# Patient Record
Sex: FEMALE | Race: WHITE | NOT HISPANIC OR LATINO | Employment: FULL TIME | URBAN - METROPOLITAN AREA
[De-identification: names, ages, dates, MRNs, and addresses within clinical notes are randomized per-mention and may not be internally consistent; named-entity substitution may affect disease eponyms.]

---

## 2023-03-21 DIAGNOSIS — E11.9 TYPE 2 DIABETES MELLITUS WITHOUT COMPLICATION, WITHOUT LONG-TERM CURRENT USE OF INSULIN (MULTI): ICD-10-CM

## 2023-03-21 RX ORDER — METFORMIN HYDROCHLORIDE 500 MG/1
500 TABLET ORAL
Qty: 60 TABLET | Refills: 0 | Status: SHIPPED | OUTPATIENT
Start: 2023-03-21 | End: 2023-04-13 | Stop reason: SDUPTHER

## 2023-03-21 RX ORDER — METFORMIN HYDROCHLORIDE 500 MG/1
500 TABLET ORAL
COMMUNITY
Start: 2019-03-19 | End: 2023-03-21 | Stop reason: SDUPTHER

## 2023-03-25 PROBLEM — E55.9 VITAMIN D DEFICIENCY: Status: ACTIVE | Noted: 2023-03-25

## 2023-03-25 PROBLEM — D72.829 ELEVATED WBCS: Status: ACTIVE | Noted: 2023-03-25

## 2023-03-25 PROBLEM — J98.8 VIRAL RESPIRATORY ILLNESS: Status: ACTIVE | Noted: 2023-03-25

## 2023-03-25 PROBLEM — T78.40XA ALLERGY: Status: ACTIVE | Noted: 2023-03-25

## 2023-03-25 PROBLEM — E66.811 OBESITY (BMI 30.0-34.9): Status: ACTIVE | Noted: 2023-03-25

## 2023-03-25 PROBLEM — E78.5 HYPERLIPIDEMIA: Status: ACTIVE | Noted: 2023-03-25

## 2023-03-25 PROBLEM — D64.9 ANEMIA: Status: ACTIVE | Noted: 2023-03-25

## 2023-03-25 PROBLEM — I10 ACCELERATED HYPERTENSION: Status: ACTIVE | Noted: 2023-03-25

## 2023-03-25 PROBLEM — E66.9 OBESITY (BMI 30.0-34.9): Status: ACTIVE | Noted: 2023-03-25

## 2023-03-25 PROBLEM — R01.1 HEART MURMUR: Status: ACTIVE | Noted: 2023-03-25

## 2023-03-25 PROBLEM — I10 HTN (HYPERTENSION): Status: ACTIVE | Noted: 2023-03-25

## 2023-03-25 PROBLEM — R00.0 SINUS TACHYCARDIA: Status: ACTIVE | Noted: 2023-03-25

## 2023-03-25 PROBLEM — R50.9 FEVER: Status: ACTIVE | Noted: 2023-03-25

## 2023-03-25 PROBLEM — I10 UNCONTROLLED HYPERTENSION: Status: ACTIVE | Noted: 2023-03-25

## 2023-03-25 PROBLEM — B97.89 VIRAL RESPIRATORY ILLNESS: Status: ACTIVE | Noted: 2023-03-25

## 2023-03-25 PROBLEM — R68.89 FLU-LIKE SYMPTOMS: Status: ACTIVE | Noted: 2023-03-25

## 2023-03-25 PROBLEM — R10.13 DYSPEPSIA: Status: ACTIVE | Noted: 2023-03-25

## 2023-03-25 PROBLEM — E11.9 T2DM (TYPE 2 DIABETES MELLITUS) (MULTI): Status: ACTIVE | Noted: 2023-03-25

## 2023-03-25 RX ORDER — ROSUVASTATIN CALCIUM 20 MG/1
1 TABLET, COATED ORAL DAILY
COMMUNITY
Start: 2019-03-21 | End: 2023-04-13 | Stop reason: SDUPTHER

## 2023-03-25 RX ORDER — LISINOPRIL 20 MG/1
1 TABLET ORAL DAILY
COMMUNITY
Start: 2023-03-03 | End: 2023-04-13 | Stop reason: SDUPTHER

## 2023-03-25 RX ORDER — METOPROLOL SUCCINATE 50 MG/1
1 TABLET, EXTENDED RELEASE ORAL DAILY
COMMUNITY
Start: 2019-05-10 | End: 2023-04-13

## 2023-03-25 RX ORDER — FLUTICASONE PROPIONATE 50 MCG
1 SPRAY, SUSPENSION (ML) NASAL DAILY
COMMUNITY
Start: 2019-03-11

## 2023-03-25 RX ORDER — ASPIRIN 81 MG/1
1 TABLET ORAL DAILY
COMMUNITY
Start: 2019-03-19

## 2023-03-25 RX ORDER — CHOLECALCIFEROL (VITAMIN D3) 50 MCG
TABLET ORAL
COMMUNITY
Start: 2019-03-19

## 2023-03-25 RX ORDER — IBUPROFEN 800 MG/1
TABLET ORAL
COMMUNITY
Start: 2021-12-07 | End: 2023-11-14 | Stop reason: WASHOUT

## 2023-03-28 ENCOUNTER — TELEPHONE (OUTPATIENT)
Dept: PRIMARY CARE | Facility: CLINIC | Age: 58
End: 2023-03-28
Payer: COMMERCIAL

## 2023-03-30 ENCOUNTER — APPOINTMENT (OUTPATIENT)
Dept: PRIMARY CARE | Facility: CLINIC | Age: 58
End: 2023-03-30
Payer: COMMERCIAL

## 2023-04-13 ENCOUNTER — OFFICE VISIT (OUTPATIENT)
Dept: PRIMARY CARE | Facility: CLINIC | Age: 58
End: 2023-04-13
Payer: COMMERCIAL

## 2023-04-13 VITALS — HEART RATE: 104 BPM | OXYGEN SATURATION: 97 % | HEIGHT: 62 IN | WEIGHT: 203 LBS | BODY MASS INDEX: 37.36 KG/M2

## 2023-04-13 DIAGNOSIS — I10 HYPERTENSION, UNSPECIFIED TYPE: Primary | ICD-10-CM

## 2023-04-13 DIAGNOSIS — E78.5 HYPERLIPIDEMIA, UNSPECIFIED HYPERLIPIDEMIA TYPE: ICD-10-CM

## 2023-04-13 DIAGNOSIS — E11.9 TYPE 2 DIABETES MELLITUS WITHOUT COMPLICATION, WITHOUT LONG-TERM CURRENT USE OF INSULIN (MULTI): ICD-10-CM

## 2023-04-13 DIAGNOSIS — I10 ACCELERATED HYPERTENSION: ICD-10-CM

## 2023-04-13 PROCEDURE — 80061 LIPID PANEL: CPT

## 2023-04-13 PROCEDURE — 99214 OFFICE O/P EST MOD 30 MIN: CPT | Performed by: INTERNAL MEDICINE

## 2023-04-13 PROCEDURE — 84443 ASSAY THYROID STIM HORMONE: CPT

## 2023-04-13 PROCEDURE — 85027 COMPLETE CBC AUTOMATED: CPT

## 2023-04-13 PROCEDURE — 4010F ACE/ARB THERAPY RXD/TAKEN: CPT | Performed by: INTERNAL MEDICINE

## 2023-04-13 PROCEDURE — 83036 HEMOGLOBIN GLYCOSYLATED A1C: CPT

## 2023-04-13 PROCEDURE — 80048 BASIC METABOLIC PNL TOTAL CA: CPT

## 2023-04-13 PROCEDURE — 1036F TOBACCO NON-USER: CPT | Performed by: INTERNAL MEDICINE

## 2023-04-13 PROCEDURE — 82570 ASSAY OF URINE CREATININE: CPT

## 2023-04-13 PROCEDURE — 82043 UR ALBUMIN QUANTITATIVE: CPT

## 2023-04-13 PROCEDURE — 36415 COLL VENOUS BLD VENIPUNCTURE: CPT | Performed by: INTERNAL MEDICINE

## 2023-04-13 RX ORDER — METFORMIN HYDROCHLORIDE 500 MG/1
500 TABLET ORAL
Qty: 180 TABLET | Refills: 1 | Status: SHIPPED | OUTPATIENT
Start: 2023-04-13 | End: 2023-04-21 | Stop reason: SDUPTHER

## 2023-04-13 RX ORDER — ROSUVASTATIN CALCIUM 20 MG/1
20 TABLET, COATED ORAL DAILY
Qty: 90 TABLET | Refills: 1 | Status: SHIPPED | OUTPATIENT
Start: 2023-04-13 | End: 2023-10-23

## 2023-04-13 RX ORDER — LISINOPRIL 20 MG/1
20 TABLET ORAL DAILY
Qty: 90 TABLET | Refills: 1 | Status: SHIPPED | OUTPATIENT
Start: 2023-04-13 | End: 2023-12-15 | Stop reason: SDUPTHER

## 2023-04-13 RX ORDER — AMLODIPINE BESYLATE 5 MG/1
5 TABLET ORAL DAILY
Qty: 30 TABLET | Refills: 2 | Status: SHIPPED | OUTPATIENT
Start: 2023-04-13 | End: 2023-08-08

## 2023-04-13 NOTE — PROGRESS NOTES
"Subjective   Patient ID: Karina Zapata is a 58 y.o. female who presents for Follow-up (Patient here for follow-up visit on blood pressure and refills).    HPI   Follow up visit, last seen in 2020, last lab testing in 2019.  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    Review of Systems  Weight gain    Objective   Pulse 104   Ht 1.575 m (5' 2\")   Wt 92.1 kg (203 lb)   SpO2 97%   BMI 37.13 kg/m²     Physical Exam  NAD. Cooperative.  HEENT: WNL  Neck: WNL  Lungs CTA  Heart: RRR    Assessment/Plan   Diagnoses and all orders for this visit:  Hypertension, unspecified type  -     lisinopril 20 mg tablet; Take 1 tablet (20 mg) by mouth once daily.  -     amLODIPine (Norvasc) 5 mg tablet; Take 1 tablet (5 mg) by mouth once daily.  -     CBC; Future  -     Albumin, urine, random; Future  Type 2 diabetes mellitus without complication, without long-term current use of insulin (CMS/[facility])  -     metFORMIN (Glucophage) 500 mg tablet; Take 1 tablet (500 mg) by mouth in the morning and 1 tablet (500 mg) in the evening. Take with meals.  -     Basic Metabolic Panel; Future  -     Hemoglobin A1C; Future  Hyperlipidemia, unspecified hyperlipidemia type  -     rosuvastatin (Crestor) 20 mg tablet; Take 1 tablet (20 mg) by mouth once daily.  -     TSH; Future  -     Lipid panel; Future  Accelerated hypertension  Close BP monitoring weekly at work, the patient is a hospice RN, office follow up in 12 weeks.     "

## 2023-04-14 LAB
ALBUMIN (MG/L) IN URINE: 28.3 MG/L
ALBUMIN/CREATININE (UG/MG) IN URINE: 51.1 UG/MG CRT (ref 0–30)
ANION GAP IN SER/PLAS: 19 MMOL/L (ref 10–20)
CALCIUM (MG/DL) IN SER/PLAS: 9.7 MG/DL (ref 8.6–10.6)
CARBON DIOXIDE, TOTAL (MMOL/L) IN SER/PLAS: 24 MMOL/L (ref 21–32)
CHLORIDE (MMOL/L) IN SER/PLAS: 99 MMOL/L (ref 98–107)
CHOLESTEROL (MG/DL) IN SER/PLAS: 249 MG/DL (ref 0–199)
CHOLESTEROL IN HDL (MG/DL) IN SER/PLAS: 72.9 MG/DL
CHOLESTEROL/HDL RATIO: 3.4
CREATININE (MG/DL) IN SER/PLAS: 0.67 MG/DL (ref 0.5–1.05)
CREATININE (MG/DL) IN URINE: 55.4 MG/DL (ref 20–320)
ERYTHROCYTE DISTRIBUTION WIDTH (RATIO) BY AUTOMATED COUNT: 12.7 % (ref 11.5–14.5)
ERYTHROCYTE MEAN CORPUSCULAR HEMOGLOBIN CONCENTRATION (G/DL) BY AUTOMATED: 30.9 G/DL (ref 32–36)
ERYTHROCYTE MEAN CORPUSCULAR VOLUME (FL) BY AUTOMATED COUNT: 87 FL (ref 80–100)
ERYTHROCYTES (10*6/UL) IN BLOOD BY AUTOMATED COUNT: 5.16 X10E12/L (ref 4–5.2)
ESTIMATED AVERAGE GLUCOSE FOR HBA1C: 295 MG/DL
GFR FEMALE: >90 ML/MIN/1.73M2
GLUCOSE (MG/DL) IN SER/PLAS: 291 MG/DL (ref 74–99)
HEMATOCRIT (%) IN BLOOD BY AUTOMATED COUNT: 45 % (ref 36–46)
HEMOGLOBIN (G/DL) IN BLOOD: 13.9 G/DL (ref 12–16)
HEMOGLOBIN A1C/HEMOGLOBIN TOTAL IN BLOOD: 11.9 %
LDL: 139 MG/DL (ref 0–99)
LEUKOCYTES (10*3/UL) IN BLOOD BY AUTOMATED COUNT: 11 X10E9/L (ref 4.4–11.3)
NRBC (PER 100 WBCS) BY AUTOMATED COUNT: 0 /100 WBC (ref 0–0)
PLATELETS (10*3/UL) IN BLOOD AUTOMATED COUNT: 338 X10E9/L (ref 150–450)
POTASSIUM (MMOL/L) IN SER/PLAS: 4.5 MMOL/L (ref 3.5–5.3)
SODIUM (MMOL/L) IN SER/PLAS: 137 MMOL/L (ref 136–145)
THYROTROPIN (MIU/L) IN SER/PLAS BY DETECTION LIMIT <= 0.05 MIU/L: 1.69 MIU/L (ref 0.44–3.98)
TRIGLYCERIDE (MG/DL) IN SER/PLAS: 184 MG/DL (ref 0–149)
UREA NITROGEN (MG/DL) IN SER/PLAS: 17 MG/DL (ref 6–23)
VLDL: 37 MG/DL (ref 0–40)

## 2023-04-20 ENCOUNTER — TELEMEDICINE (OUTPATIENT)
Dept: PRIMARY CARE | Facility: CLINIC | Age: 58
End: 2023-04-20
Payer: COMMERCIAL

## 2023-04-21 ENCOUNTER — TELEMEDICINE (OUTPATIENT)
Dept: PRIMARY CARE | Facility: CLINIC | Age: 58
End: 2023-04-21
Payer: COMMERCIAL

## 2023-04-21 DIAGNOSIS — E78.5 HYPERLIPIDEMIA, UNSPECIFIED HYPERLIPIDEMIA TYPE: ICD-10-CM

## 2023-04-21 DIAGNOSIS — E66.9 CLASS 2 OBESITY WITHOUT SERIOUS COMORBIDITY WITH BODY MASS INDEX (BMI) OF 37.0 TO 37.9 IN ADULT, UNSPECIFIED OBESITY TYPE: ICD-10-CM

## 2023-04-21 DIAGNOSIS — E11.9 TYPE 2 DIABETES MELLITUS WITHOUT COMPLICATION, WITHOUT LONG-TERM CURRENT USE OF INSULIN (MULTI): Primary | ICD-10-CM

## 2023-04-21 PROCEDURE — 99213 OFFICE O/P EST LOW 20 MIN: CPT | Performed by: INTERNAL MEDICINE

## 2023-04-21 RX ORDER — DULAGLUTIDE 0.75 MG/.5ML
0.75 INJECTION, SOLUTION SUBCUTANEOUS
Qty: 4 EACH | Refills: 11 | Status: SHIPPED | OUTPATIENT
Start: 2023-04-21 | End: 2023-08-09 | Stop reason: ALTCHOICE

## 2023-04-21 RX ORDER — METFORMIN HYDROCHLORIDE 500 MG/1
1000 TABLET ORAL
Qty: 180 TABLET | Refills: 1 | Status: SHIPPED | OUTPATIENT
Start: 2023-04-21 | End: 2024-04-16

## 2023-04-21 NOTE — PROGRESS NOTES
Subjective   Patient ID: Karina Zapata is a 58 y.o. female who presents for Follow-up (Video visit for lab results and treatment plans).  Lab results from 4/13/23 discussion and treatment plan. DM, lipids and weight goals were discussed.    Assessment/Plan   Diagnoses and all orders for this visit:  Type 2 diabetes mellitus without complication, without long-term current use of insulin (CMS/[facility])  -     dulaglutide (Trulicity) 0.75 mg/0.5 mL pen injector; Inject 0.75 mg under the skin 1 (one) time per week.  -     metFORMIN (Glucophage) 500 mg tablet; Take 2 tablets (1,000 mg) by mouth in the morning and 2 tablets (1,000 mg) in the evening. Take with meals.  Hyperlipidemia, unspecified hyperlipidemia type  Class 2 obesity without serious comorbidity with body mass index (BMI) of 37.0 to 37.9 in adult, unspecified obesity type  Rosuvastatin 20 mg at bed time,   3 months follow up labs   Discussed weight goals, recommended gradual weight loss by daily caloric reduction and low to moderate intensity exercise as tolerated, optimum BMI is 27

## 2023-08-06 DIAGNOSIS — I10 HYPERTENSION, UNSPECIFIED TYPE: ICD-10-CM

## 2023-08-08 DIAGNOSIS — E11.65 TYPE 2 DIABETES MELLITUS WITH HYPERGLYCEMIA, WITHOUT LONG-TERM CURRENT USE OF INSULIN (MULTI): Primary | ICD-10-CM

## 2023-08-08 RX ORDER — AMLODIPINE BESYLATE 5 MG/1
5 TABLET ORAL DAILY
Qty: 30 TABLET | Refills: 0 | Status: SHIPPED | OUTPATIENT
Start: 2023-08-08 | End: 2023-10-11

## 2023-08-09 ENCOUNTER — TELEMEDICINE (OUTPATIENT)
Dept: PHARMACY | Facility: HOSPITAL | Age: 58
End: 2023-08-09
Payer: COMMERCIAL

## 2023-08-09 DIAGNOSIS — E11.65 TYPE 2 DIABETES MELLITUS WITH HYPERGLYCEMIA, WITHOUT LONG-TERM CURRENT USE OF INSULIN (MULTI): Primary | ICD-10-CM

## 2023-08-09 RX ORDER — TIRZEPATIDE 2.5 MG/.5ML
2.5 INJECTION, SOLUTION SUBCUTANEOUS
Qty: 2 ML | Refills: 1 | Status: SHIPPED | OUTPATIENT
Start: 2023-08-09 | End: 2023-09-12 | Stop reason: ALTCHOICE

## 2023-08-09 ASSESSMENT — ENCOUNTER SYMPTOMS: DIABETIC ASSOCIATED SYMPTOMS: 0

## 2023-08-09 NOTE — ASSESSMENT & PLAN NOTE
Patient's diabetes is poorly controlled with most recent A1c of 11.9% on 4/13/23 (Goal < 7%).   Initiate: Mounjaro 2.5 mg once weekly  Continue: metformin 500 mg 2 tablets twice daily  Discontinue: Trulicity 0.75 mg (patient never received Rx)  Compliance at present is estimated to be very good.   Mounjaro education:   Counseled patient on MOA, expectations, side effects, duration of therapy, contraindications, administration, and monitoring parameters  Answered all patient questions and concerns  Counseled patient on Mounjaro MOA, expectations, side effects, duration of therapy, administration, and monitoring parameters.  Provided detailed dosing and administration counseling to ensure proper technique.   Reviewed Mounjaro titration schedule, starting with 2.5 mg once weekly to a goal of 15 mg once weekly if tolerated  Counseled patient on the benefits of GLP-1ra glycemic control and weight loss  Reviewed storage requirements of Mounjaro when not in use, and when to administer the medication if a dose is missed.  Advised patient that they may experience improved satiety after meals and portion sizes of meals may be reduced as doses of Mounjaro increase.    Follow-up: 8/29/23  PCP Follow-Up: patient to schedule for September

## 2023-08-09 NOTE — PROGRESS NOTES
Subjective   Patient ID: Karina Zapata is a 58 y.o. female who presents for Diabetes.    Referring Provider: Jacqui Johnston MD     Diabetes  She presents for her initial diabetic visit. She has type 2 diabetes mellitus. There are no diabetic associated symptoms. There are no hypoglycemic complications. Current diabetic treatment includes diet and oral agent (monotherapy). She is compliant with treatment all of the time. She is following a generally healthy diet. She participates in exercise three times a week. An ACE inhibitor/angiotensin II receptor blocker is being taken.     Patient stopped taking care of herself for 1-2 years due to deaths in the family. She has recently worked on her health.  Diet: More salad, less bread, leaner proteins, 1-2 cups of coffee, occasionally 1-2 glasses of wine  Exercise: 3-5x/week - [place], free weights/weight machines, 10,000-70996 steps/day, swimming since summer    Objective     There were no vitals taken for this visit.     Labs  Lab Results   Component Value Date    CALCIUM 9.7 04/13/2023    CO2 24 04/13/2023    CL 99 04/13/2023    CREATININE 0.67 04/13/2023    GLUCOSE 291 (H) 04/13/2023    K 4.5 04/13/2023     04/13/2023    BUN 17 04/13/2023    ANIONGAP 19 04/13/2023    GFRF >90 04/13/2023     Lab Results   Component Value Date    TRIG 184 (H) 04/13/2023    CHOL 249 (H) 04/13/2023    HDL 72.9 04/13/2023     Lab Results   Component Value Date    HGBA1C 11.9 (A) 04/13/2023       Current Outpatient Medications on File Prior to Visit   Medication Sig Dispense Refill    amLODIPine (Norvasc) 5 mg tablet Take 1 tablet (5 mg) by mouth once daily. ----DUE FOR APPT 30 tablet 0    aspirin 81 mg EC tablet Take 1 tablet (81 mg) by mouth once daily.      cholecalciferol (Vitamin D-3) 50 MCG (2000 UT) tablet Vitamin D3 2000 IU take one daily      fluticasone (Flonase) 50 mcg/actuation nasal spray Administer 1 spray into affected nostril(s) once daily.      ibuprofen  800 mg tablet TAKE 1 TABLET BY MOUTH EVERY 8 HOURS WITH FOOD AS NEEDED      lisinopril 20 mg tablet Take 1 tablet (20 mg) by mouth once daily. 90 tablet 1    metFORMIN (Glucophage) 500 mg tablet Take 2 tablets (1,000 mg) by mouth in the morning and 2 tablets (1,000 mg) in the evening. Take with meals. 180 tablet 1    rosuvastatin (Crestor) 20 mg tablet Take 1 tablet (20 mg) by mouth once daily. 90 tablet 1    [DISCONTINUED] amLODIPine (Norvasc) 5 mg tablet Take 1 tablet (5 mg) by mouth once daily. 30 tablet 2    [DISCONTINUED] dulaglutide (Trulicity) 0.75 mg/0.5 mL pen injector Inject 0.75 mg under the skin 1 (one) time per week. 4 each 11     No current facility-administered medications on file prior to visit.        Assessment/Plan   Problem List Items Addressed This Visit       T2DM (type 2 diabetes mellitus) (CMS/[facility]) - Primary     Patient's diabetes is poorly controlled with most recent A1c of 11.9% on 4/13/23 (Goal < 7%).   Initiate: Mounjaro 2.5 mg once weekly  Continue: metformin 500 mg 2 tablets twice daily  Discontinue: Trulicity 0.75 mg (patient never received Rx)  Compliance at present is estimated to be very good.   Mounjaro education:   Counseled patient on MOA, expectations, side effects, duration of therapy, contraindications, administration, and monitoring parameters  Answered all patient questions and concerns  Counseled patient on Mounjaro MOA, expectations, side effects, duration of therapy, administration, and monitoring parameters.  Provided detailed dosing and administration counseling to ensure proper technique.   Reviewed Mounjaro titration schedule, starting with 2.5 mg once weekly to a goal of 15 mg once weekly if tolerated  Counseled patient on the benefits of GLP-1ra glycemic control and weight loss  Reviewed storage requirements of Mounjaro when not in use, and when to administer the medication if a dose is missed.  Advised patient that they may experience improved satiety after meals  and portion sizes of meals may be reduced as doses of Mounjaro increase.    Follow-up: 8/29/23  PCP Follow-Up: patient to schedule for September         Relevant Medications    tirzepatide (Mounjaro) 2.5 mg/0.5 mL pen injector    Other Relevant Orders    Follow Up In [facility] - Pharmacy       Meg LazoD    Continue all meds under the continuation of care with the referring provider and clinical pharmacy team.    Verbal consent to manage patient's drug therapy was obtained from the patient. They were informed they may decline to participate or withdraw from participation in pharmacy services at any time.

## 2023-08-09 NOTE — PATIENT INSTRUCTIONS
Thank you for speaking with me today, Shannen. Since you did not start Trulicity in April we will start Mounjaro 2.5 mg once weekly injection. The prescription has been sent to [facility] for you to  at your convenience. Sounds like you are taking control of your health. Keep it up! We will have another appointment on 8/29 at 2:30. Don't hesitate to call me at [phone] before then if any issues arise before then.

## 2023-08-29 ENCOUNTER — APPOINTMENT (OUTPATIENT)
Dept: PHARMACY | Facility: HOSPITAL | Age: 58
End: 2023-08-29
Payer: COMMERCIAL

## 2023-09-12 ENCOUNTER — TELEMEDICINE (OUTPATIENT)
Dept: PHARMACY | Facility: HOSPITAL | Age: 58
End: 2023-09-12
Payer: COMMERCIAL

## 2023-09-12 DIAGNOSIS — E11.65 TYPE 2 DIABETES MELLITUS WITH HYPERGLYCEMIA, WITHOUT LONG-TERM CURRENT USE OF INSULIN (MULTI): Primary | ICD-10-CM

## 2023-09-12 RX ORDER — TIRZEPATIDE 5 MG/.5ML
5 INJECTION, SOLUTION SUBCUTANEOUS
Qty: 2 ML | Refills: 1 | Status: SHIPPED | OUTPATIENT
Start: 2023-09-12 | End: 2023-10-10 | Stop reason: ALTCHOICE

## 2023-09-12 ASSESSMENT — ENCOUNTER SYMPTOMS: DIABETIC ASSOCIATED SYMPTOMS: 0

## 2023-09-12 NOTE — PATIENT INSTRUCTIONS
Great to hear that you've noticed a difference in energy levels and that you're continuing to make lifestyle modifications, Shannen. Keep up the good work! I have sent a prescription for Mounjaro 5 mg to [facility] and ordered bloodwork for you to complete anytime between now and your next appointment with Dr. Roth. Our next phone call visit will be October 10th at 9am. If you have any questions before then, feel free to call me at [phone].

## 2023-09-12 NOTE — PROGRESS NOTES
Subjective   Patient ID: Karina Zapata is a 58 y.o. female who presents for Diabetes.    Referring Provider: Jacqui Johnston MD     Diabetes  She presents for her initial diabetic visit. She has type 2 diabetes mellitus. There are no diabetic associated symptoms. There are no hypoglycemic complications. Current diabetic treatment includes diet and oral agent (monotherapy). She is compliant with treatment all of the time. She is following a generally healthy diet. She participates in exercise three times a week. An ACE inhibitor/angiotensin II receptor blocker is being taken.     Patient stopped taking care of herself for 1-2 years due to deaths in the family. She has recently worked on her health.  Diet: More salad, less bread, leaner proteins, 1-2 cups of coffee, occasionally 1-2 glasses of wine  Exercise: 3-5x/week - [place], free weights/weight machines, 10,000-07675 steps/day, swimming since summer    Objective     There were no vitals taken for this visit.     Labs  Lab Results   Component Value Date    CALCIUM 9.7 04/13/2023    CO2 24 04/13/2023    CL 99 04/13/2023    CREATININE 0.67 04/13/2023    GLUCOSE 291 (H) 04/13/2023    K 4.5 04/13/2023     04/13/2023    BUN 17 04/13/2023    ANIONGAP 19 04/13/2023    GFRF >90 04/13/2023     Lab Results   Component Value Date    TRIG 184 (H) 04/13/2023    CHOL 249 (H) 04/13/2023    HDL 72.9 04/13/2023     Lab Results   Component Value Date    HGBA1C 11.9 (A) 04/13/2023       Current Outpatient Medications on File Prior to Visit   Medication Sig Dispense Refill    amLODIPine (Norvasc) 5 mg tablet Take 1 tablet (5 mg) by mouth once daily. ----DUE FOR APPT 30 tablet 0    aspirin 81 mg EC tablet Take 1 tablet (81 mg) by mouth once daily.      cholecalciferol (Vitamin D-3) 50 MCG (2000 UT) tablet Vitamin D3 2000 IU take one daily      fluticasone (Flonase) 50 mcg/actuation nasal spray Administer 1 spray into affected nostril(s) once daily.      ibuprofen  800 mg tablet TAKE 1 TABLET BY MOUTH EVERY 8 HOURS WITH FOOD AS NEEDED      lisinopril 20 mg tablet Take 1 tablet (20 mg) by mouth once daily. 90 tablet 1    metFORMIN (Glucophage) 500 mg tablet Take 2 tablets (1,000 mg) by mouth in the morning and 2 tablets (1,000 mg) in the evening. Take with meals. 180 tablet 1    rosuvastatin (Crestor) 20 mg tablet Take 1 tablet (20 mg) by mouth once daily. 90 tablet 1    [DISCONTINUED] tirzepatide (Mounjaro) 2.5 mg/0.5 mL pen injector Inject 2.5 mg under the skin every 7 days. 2 mL 1     No current facility-administered medications on file prior to visit.        Assessment/Plan   Problem List Items Addressed This Visit       T2DM (type 2 diabetes mellitus) (CMS/[facility]) - Primary     Patient's diabetes is poorly controlled with most recent A1c of 11.9% on 4/13/23 (Goal < 7%).   Change: Mounjaro 2.5 mg once weekly to 5 mg once weekly  Continue: metformin 500 mg 2 tablets twice daily  Ordered A1C and BMP to complete between now and next PCP appointment.  Compliance at present is estimated to be very good.   Follow-up: 10/10 @9am  PCP Follow-Up: patient to schedule for October         Relevant Medications    tirzepatide (Mounjaro) 5 mg/0.5 mL pen injector    Other Relevant Orders    Follow Up In [facility] - Pharmacy    Hemoglobin A1C    Basic Metabolic Panel       Ana Garland, PharmD    Continue all meds under the continuation of care with the referring provider and clinical pharmacy team.    Verbal consent to manage patient's drug therapy was obtained from the patient. They were informed they may decline to participate or withdraw from participation in pharmacy services at any time.

## 2023-09-12 NOTE — ASSESSMENT & PLAN NOTE
Patient's diabetes is poorly controlled with most recent A1c of 11.9% on 4/13/23 (Goal < 7%).   Change: Mounjaro 2.5 mg once weekly to 5 mg once weekly  Continue: metformin 500 mg 2 tablets twice daily  Ordered A1C and BMP to complete between now and next PCP appointment.  Compliance at present is estimated to be very good.   Follow-up: 10/10 @9am  PCP Follow-Up: patient to schedule for October

## 2023-10-09 DIAGNOSIS — I10 HYPERTENSION, UNSPECIFIED TYPE: ICD-10-CM

## 2023-10-10 ENCOUNTER — TELEMEDICINE (OUTPATIENT)
Dept: PHARMACY | Facility: HOSPITAL | Age: 58
End: 2023-10-10
Payer: COMMERCIAL

## 2023-10-10 DIAGNOSIS — E11.65 TYPE 2 DIABETES MELLITUS WITH HYPERGLYCEMIA, WITHOUT LONG-TERM CURRENT USE OF INSULIN (MULTI): Primary | ICD-10-CM

## 2023-10-10 RX ORDER — TIRZEPATIDE 7.5 MG/.5ML
7.5 INJECTION, SOLUTION SUBCUTANEOUS
Qty: 2 ML | Refills: 1 | Status: SHIPPED | OUTPATIENT
Start: 2023-10-10 | End: 2023-12-12 | Stop reason: ALTCHOICE

## 2023-10-10 ASSESSMENT — ENCOUNTER SYMPTOMS: DIABETIC ASSOCIATED SYMPTOMS: 0

## 2023-10-10 NOTE — ASSESSMENT & PLAN NOTE
Patient's diabetes is poorly controlled with most recent A1c of 11.9% on 4/13/23 (Goal < 7%).    She has had 2 doses of 5 mg without issues.  Change: Mounjaro 5 mg once weekly to 7.5 mg once weekly (first dose due 10/30/23). Will send prescription to [facility] for pickup when ready.  Continue: metformin 500 mg 2 tablets twice daily  Patient will get A1C and BMP done this week.  Compliance at present is estimated to be very good.   Follow-up: 11/14 @8:30am  PCP Follow-Up: patient to schedule for this month

## 2023-10-10 NOTE — PROGRESS NOTES
Subjective   Patient ID: Karina Zapata is a 58 y.o. female who presents for Diabetes.    Referring Provider: Jacqui Johnston MD     Diabetes  She presents for her follow-up diabetic visit. She has type 2 diabetes mellitus. There are no diabetic associated symptoms. There are no hypoglycemic complications. Current diabetic treatment includes diet and oral agent (monotherapy). She is compliant with treatment all of the time. She is following a generally healthy diet. She participates in exercise three times a week. An ACE inhibitor/angiotensin II receptor blocker is being taken.     Patient stopped taking care of herself for 1-2 years due to deaths in the family. She has recently worked on her health.  Diet: More salad, less bread, leaner proteins, 1-2 cups of coffee, occasionally 1-2 glasses of wine  Exercise: 3-5x/week - [place], free weights/weight machines, 10,000-47591 steps/day, swimming since summer    Shannen reports she has lost 5 lbs in the past 10 days, likely due to Mounjaro and a lot of walking on vacation last week. She reports no issues with increased dose.     Objective     There were no vitals taken for this visit.     Labs  Lab Results   Component Value Date    CALCIUM 9.7 04/13/2023    CO2 24 04/13/2023    CL 99 04/13/2023    CREATININE 0.67 04/13/2023    GLUCOSE 291 (H) 04/13/2023    K 4.5 04/13/2023     04/13/2023    BUN 17 04/13/2023    ANIONGAP 19 04/13/2023    GFRF >90 04/13/2023     Lab Results   Component Value Date    TRIG 184 (H) 04/13/2023    CHOL 249 (H) 04/13/2023    HDL 72.9 04/13/2023     Lab Results   Component Value Date    HGBA1C 11.9 (A) 04/13/2023       Current Outpatient Medications on File Prior to Visit   Medication Sig Dispense Refill    amLODIPine (Norvasc) 5 mg tablet Take 1 tablet (5 mg) by mouth once daily. ----DUE FOR APPT 30 tablet 0    aspirin 81 mg EC tablet Take 1 tablet (81 mg) by mouth once daily.      cholecalciferol (Vitamin D-3) 50 MCG (2000 UT)  tablet Vitamin D3 2000 IU take one daily      fluticasone (Flonase) 50 mcg/actuation nasal spray Administer 1 spray into affected nostril(s) once daily.      ibuprofen 800 mg tablet TAKE 1 TABLET BY MOUTH EVERY 8 HOURS WITH FOOD AS NEEDED      lisinopril 20 mg tablet Take 1 tablet (20 mg) by mouth once daily. 90 tablet 1    metFORMIN (Glucophage) 500 mg tablet Take 2 tablets (1,000 mg) by mouth in the morning and 2 tablets (1,000 mg) in the evening. Take with meals. 180 tablet 1    rosuvastatin (Crestor) 20 mg tablet Take 1 tablet (20 mg) by mouth once daily. 90 tablet 1    [DISCONTINUED] tirzepatide (Mounjaro) 5 mg/0.5 mL pen injector Inject 5 mg under the skin every 7 days. 2 mL 1    [DISCONTINUED] tirzepatide 5 mg/0.5 mL pen injector INJECT 1 PEN UNDER THE SKIN EVERY 7 DAYS 2 mL 1     No current facility-administered medications on file prior to visit.        Assessment/Plan   Problem List Items Addressed This Visit       T2DM (type 2 diabetes mellitus) (CMS/[facility]) - Primary     Patient's diabetes is poorly controlled with most recent A1c of 11.9% on 4/13/23 (Goal < 7%).    She has had 2 doses of 5 mg without issues.  Change: Mounjaro 5 mg once weekly to 7.5 mg once weekly (first dose due 10/30/23). Will send prescription to [facility] for pickup when ready.  Continue: metformin 500 mg 2 tablets twice daily  Patient will get A1C and BMP done this week.  Compliance at present is estimated to be very good.   Follow-up: 11/14 @8:30am  PCP Follow-Up: patient to schedule for this month         Relevant Medications    tirzepatide (Mounjaro) 7.5 mg/0.5 mL pen injector    Other Relevant Orders    Follow Up In [facility] - Pharmacy       Ana Garland, PharmD    Continue all meds under the continuation of care with the referring provider and clinical pharmacy team.    Verbal consent to manage patient's drug therapy was obtained from the patient. They were informed they may decline to participate or  withdraw from participation in pharmacy services at any time.

## 2023-10-10 NOTE — PATIENT INSTRUCTIONS
Nice talking to you today, Shannen. I have sent a prescription for Mounjaro 7.5 mg to [facility] for you to  in a couple weeks. Our next phone call visit will be November 14th at 8:30am. If you have any questions before then, feel free to call me at [phone].

## 2023-10-11 RX ORDER — AMLODIPINE BESYLATE 5 MG/1
5 TABLET ORAL DAILY
Qty: 30 TABLET | Refills: 0 | Status: SHIPPED | OUTPATIENT
Start: 2023-10-11 | End: 2023-11-12

## 2023-10-20 ENCOUNTER — PHARMACY VISIT (OUTPATIENT)
Dept: PHARMACY | Facility: CLINIC | Age: 58
End: 2023-10-20
Payer: COMMERCIAL

## 2023-10-20 PROCEDURE — RXMED WILLOW AMBULATORY MEDICATION CHARGE

## 2023-10-22 DIAGNOSIS — E78.5 HYPERLIPIDEMIA, UNSPECIFIED HYPERLIPIDEMIA TYPE: ICD-10-CM

## 2023-10-23 RX ORDER — ROSUVASTATIN CALCIUM 20 MG/1
20 TABLET, COATED ORAL DAILY
Qty: 30 TABLET | Refills: 0 | Status: SHIPPED | OUTPATIENT
Start: 2023-10-23 | End: 2023-12-07

## 2023-11-12 DIAGNOSIS — I10 HYPERTENSION, UNSPECIFIED TYPE: ICD-10-CM

## 2023-11-12 RX ORDER — AMLODIPINE BESYLATE 5 MG/1
5 TABLET ORAL DAILY
Qty: 30 TABLET | Refills: 0 | Status: SHIPPED | OUTPATIENT
Start: 2023-11-12 | End: 2024-01-08

## 2023-11-14 ENCOUNTER — PHARMACY VISIT (OUTPATIENT)
Dept: PHARMACY | Facility: CLINIC | Age: 58
End: 2023-11-14
Payer: COMMERCIAL

## 2023-11-14 ENCOUNTER — TELEMEDICINE (OUTPATIENT)
Dept: PHARMACY | Facility: HOSPITAL | Age: 58
End: 2023-11-14
Payer: COMMERCIAL

## 2023-11-14 DIAGNOSIS — E11.65 TYPE 2 DIABETES MELLITUS WITH HYPERGLYCEMIA, WITHOUT LONG-TERM CURRENT USE OF INSULIN (MULTI): ICD-10-CM

## 2023-11-14 PROCEDURE — RXMED WILLOW AMBULATORY MEDICATION CHARGE

## 2023-11-14 ASSESSMENT — ENCOUNTER SYMPTOMS: DIABETIC ASSOCIATED SYMPTOMS: 0

## 2023-11-14 NOTE — PROGRESS NOTES
Subjective   Patient ID: Karina Zapata is a 58 y.o. female who presents for Diabetes.    Referring Provider: Jacqui Johnston     Diabetes  She presents for her follow-up diabetic visit. She has type 2 diabetes mellitus. There are no diabetic associated symptoms. There are no hypoglycemic complications. Current diabetic treatment includes diet and oral agent (monotherapy). She is compliant with treatment all of the time. She is following a generally healthy diet. She participates in exercise three times a week. An ACE inhibitor/angiotensin II receptor blocker is being taken.     BP at home is lower recently. Has had a little bit of nausea with Mounjaro 7.5 mg. She is unsure if it is due to the increased dose or allergies/post nasal drip.    Objective     There were no vitals taken for this visit.     Labs  Lab Results   Component Value Date    CALCIUM 9.7 04/13/2023    CO2 24 04/13/2023    CL 99 04/13/2023    CREATININE 0.67 04/13/2023    GLUCOSE 291 (H) 04/13/2023    K 4.5 04/13/2023     04/13/2023    BUN 17 04/13/2023    ANIONGAP 19 04/13/2023    GFRF >90 04/13/2023     Lab Results   Component Value Date    TRIG 184 (H) 04/13/2023    CHOL 249 (H) 04/13/2023    HDL 72.9 04/13/2023     Lab Results   Component Value Date    HGBA1C 11.9 (A) 04/13/2023       Current Outpatient Medications on File Prior to Visit   Medication Sig Dispense Refill    amLODIPine (Norvasc) 5 mg tablet Take 1 tablet (5 mg) by mouth once daily. 30 tablet 0    aspirin 81 mg EC tablet Take 1 tablet (81 mg) by mouth once daily.      cholecalciferol (Vitamin D-3) 50 MCG (2000 UT) tablet Vitamin D3 2000 IU take one daily      fluticasone (Flonase) 50 mcg/actuation nasal spray Administer 1 spray into affected nostril(s) once daily.      lisinopril 20 mg tablet Take 1 tablet (20 mg) by mouth once daily. 90 tablet 1    metFORMIN (Glucophage) 500 mg tablet Take 2 tablets (1,000 mg) by mouth in the morning and 2 tablets (1,000 mg)  in the evening. Take with meals. 180 tablet 1    rosuvastatin (Crestor) 20 mg tablet Take 1 tablet (20 mg) by mouth once daily. ----DUE FOR IN OFFICE APPT 30 tablet 0    tirzepatide (Mounjaro) 7.5 mg/0.5 mL pen injector Inject 7.5 mg under the skin every 7 days. 2 mL 1    [DISCONTINUED] amLODIPine (Norvasc) 5 mg tablet TAKE ONE TABLET BY MOUTH EVERY DAY. DUE FOR APPOINTMENT 30 tablet 0    [DISCONTINUED] ibuprofen 800 mg tablet TAKE 1 TABLET BY MOUTH EVERY 8 HOURS WITH FOOD AS NEEDED       No current facility-administered medications on file prior to visit.        Assessment/Plan   Problem List Items Addressed This Visit       T2DM (type 2 diabetes mellitus) (CMS/[facility])     Patient's diabetes is poorly controlled with most recent A1c of 11.9% on 4/13/23 (Goal < 7%). Shannen is due for updated blood work at her next appointment.  Continue: Mounjaro 7.5 mg weekly due to nausea  Continue: metformin 500 mg 2 tablets twice daily  Compliance at present is estimated to be very good.   Follow-up: 12/12/23 @8:30am  PCP Follow-Up: 11/30/23            Ana Garland PharmD    Continue all meds under the continuation of care with the referring provider and clinical pharmacy team.    Verbal consent to manage patient's drug therapy was obtained from the patient. They were informed they may decline to participate or withdraw from participation in pharmacy services at any time.

## 2023-11-14 NOTE — ASSESSMENT & PLAN NOTE
Patient's diabetes is poorly controlled with most recent A1c of 11.9% on 4/13/23 (Goal < 7%). Shannen is due for updated blood work at her next appointment.  Continue: Mounjaro 7.5 mg weekly due to nausea  Continue: metformin 500 mg 2 tablets twice daily  Compliance at present is estimated to be very good.   Follow-up: 12/12/23 @8:30am  PCP Follow-Up: 11/30/23   yes

## 2023-11-16 ENCOUNTER — APPOINTMENT (OUTPATIENT)
Dept: PRIMARY CARE | Facility: CLINIC | Age: 58
End: 2023-11-16
Payer: COMMERCIAL

## 2023-11-30 ENCOUNTER — APPOINTMENT (OUTPATIENT)
Dept: PRIMARY CARE | Facility: CLINIC | Age: 58
End: 2023-11-30
Payer: COMMERCIAL

## 2023-12-06 DIAGNOSIS — E78.5 HYPERLIPIDEMIA, UNSPECIFIED HYPERLIPIDEMIA TYPE: ICD-10-CM

## 2023-12-07 RX ORDER — ROSUVASTATIN CALCIUM 20 MG/1
20 TABLET, COATED ORAL DAILY
Qty: 30 TABLET | Refills: 1 | Status: SHIPPED | OUTPATIENT
Start: 2023-12-07 | End: 2024-02-05

## 2023-12-12 ENCOUNTER — TELEMEDICINE (OUTPATIENT)
Dept: PHARMACY | Facility: HOSPITAL | Age: 58
End: 2023-12-12
Payer: COMMERCIAL

## 2023-12-12 DIAGNOSIS — E11.65 TYPE 2 DIABETES MELLITUS WITH HYPERGLYCEMIA, WITHOUT LONG-TERM CURRENT USE OF INSULIN (MULTI): ICD-10-CM

## 2023-12-12 PROCEDURE — RXMED WILLOW AMBULATORY MEDICATION CHARGE

## 2023-12-12 RX ORDER — TIRZEPATIDE 10 MG/.5ML
10 INJECTION, SOLUTION SUBCUTANEOUS
Qty: 2 ML | Refills: 2 | Status: SHIPPED | OUTPATIENT
Start: 2023-12-12 | End: 2024-02-27 | Stop reason: DRUGHIGH

## 2023-12-12 ASSESSMENT — ENCOUNTER SYMPTOMS: DIABETIC ASSOCIATED SYMPTOMS: 0

## 2023-12-12 NOTE — ASSESSMENT & PLAN NOTE
Patient's diabetes is poorly controlled with most recent A1c of 11.9% on 4/13/23 (Goal < 7%). Shannen is due for updated blood work at her next appointment.  Increase: Mounjaro to 10 mg subcutaneously weekly  Continue: metformin 500 mg 2 tablets twice daily  Compliance at present is estimated to be very good.   Follow-up: 1/9/24 @8:30am  PCP Follow-Up: 1/12/24

## 2023-12-12 NOTE — PROGRESS NOTES
Subjective   Patient ID: Karina Zapata is a 58 y.o. female who presents for Diabetes.    Referring Provider: Jacqui Johnston     Diabetes  She presents for her follow-up diabetic visit. She has type 2 diabetes mellitus. There are no diabetic associated symptoms. There are no hypoglycemic complications. Current diabetic treatment includes diet and oral agent (monotherapy). She is compliant with treatment all of the time. She is following a generally healthy diet. She participates in exercise three times a week. An ACE inhibitor/angiotensin II receptor blocker is being taken.     No longer having nausea with Mounjaro 7.5 mg.     Objective     There were no vitals taken for this visit.     Labs  Lab Results   Component Value Date    CALCIUM 9.7 04/13/2023    CO2 24 04/13/2023    CL 99 04/13/2023    CREATININE 0.67 04/13/2023    GLUCOSE 291 (H) 04/13/2023    K 4.5 04/13/2023     04/13/2023    BUN 17 04/13/2023    ANIONGAP 19 04/13/2023    GFRF >90 04/13/2023     Lab Results   Component Value Date    TRIG 184 (H) 04/13/2023    CHOL 249 (H) 04/13/2023    HDL 72.9 04/13/2023     Lab Results   Component Value Date    HGBA1C 11.9 (A) 04/13/2023       Current Outpatient Medications on File Prior to Visit   Medication Sig Dispense Refill    amLODIPine (Norvasc) 5 mg tablet Take 1 tablet (5 mg) by mouth once daily. 30 tablet 0    aspirin 81 mg EC tablet Take 1 tablet (81 mg) by mouth once daily.      cholecalciferol (Vitamin D-3) 50 MCG (2000 UT) tablet Vitamin D3 2000 IU take one daily      fluticasone (Flonase) 50 mcg/actuation nasal spray Administer 1 spray into affected nostril(s) once daily.      lisinopril 20 mg tablet Take 1 tablet (20 mg) by mouth once daily. 90 tablet 1    metFORMIN (Glucophage) 500 mg tablet Take 2 tablets (1,000 mg) by mouth in the morning and 2 tablets (1,000 mg) in the evening. Take with meals. 180 tablet 1    rosuvastatin (Crestor) 20 mg tablet Take 1 tablet (20 mg) by mouth  once daily. 30 tablet 1    [DISCONTINUED] rosuvastatin (Crestor) 20 mg tablet Take 1 tablet (20 mg) by mouth once daily. ----DUE FOR IN OFFICE APPT 30 tablet 0    [DISCONTINUED] tirzepatide (Mounjaro) 7.5 mg/0.5 mL pen injector Inject 7.5 mg under the skin every 7 days. 2 mL 1     No current facility-administered medications on file prior to visit.        Assessment/Plan   Problem List Items Addressed This Visit       T2DM (type 2 diabetes mellitus) (CMS/[facility])     Patient's diabetes is poorly controlled with most recent A1c of 11.9% on 4/13/23 (Goal < 7%). Shannen is due for updated blood work at her next appointment.  Increase: Mounjaro to 10 mg subcutaneously weekly  Continue: metformin 500 mg 2 tablets twice daily  Compliance at present is estimated to be very good.   Follow-up: 1/9/24 @8:30am  PCP Follow-Up: 1/12/24            Meg LazoD    Continue all meds under the continuation of care with the referring provider and clinical pharmacy team.    Verbal consent to manage patient's drug therapy was obtained from the patient. They were informed they may decline to participate or withdraw from participation in pharmacy services at any time.

## 2023-12-15 DIAGNOSIS — I10 HYPERTENSION, UNSPECIFIED TYPE: ICD-10-CM

## 2023-12-16 ENCOUNTER — PHARMACY VISIT (OUTPATIENT)
Dept: PHARMACY | Facility: CLINIC | Age: 58
End: 2023-12-16
Payer: COMMERCIAL

## 2023-12-18 RX ORDER — LISINOPRIL 20 MG/1
20 TABLET ORAL DAILY
Qty: 90 TABLET | Refills: 0 | Status: SHIPPED | OUTPATIENT
Start: 2023-12-18 | End: 2024-03-11

## 2024-01-05 DIAGNOSIS — I10 HYPERTENSION, UNSPECIFIED TYPE: ICD-10-CM

## 2024-01-08 RX ORDER — AMLODIPINE BESYLATE 5 MG/1
5 TABLET ORAL DAILY
Qty: 30 TABLET | Refills: 0 | Status: SHIPPED | OUTPATIENT
Start: 2024-01-08 | End: 2024-02-05

## 2024-01-09 ENCOUNTER — TELEMEDICINE (OUTPATIENT)
Dept: PHARMACY | Facility: HOSPITAL | Age: 59
End: 2024-01-09
Payer: COMMERCIAL

## 2024-01-09 DIAGNOSIS — E11.65 TYPE 2 DIABETES MELLITUS WITH HYPERGLYCEMIA, WITHOUT LONG-TERM CURRENT USE OF INSULIN (MULTI): ICD-10-CM

## 2024-01-09 ASSESSMENT — ENCOUNTER SYMPTOMS: DIABETIC ASSOCIATED SYMPTOMS: 0

## 2024-01-09 NOTE — ASSESSMENT & PLAN NOTE
Patient's diabetes is poorly controlled with most recent A1c of 11.9% on 4/13/23 (Goal < 7%). She does not check her blood sugar. Shannen is due for updated blood work at her next appointment. She has had a little nausea with Mounjaro 10 mg occasionally, but she thinks it may be stress related.  Continue: Mounjaro 10 mg subcutaneously weekly  Continue: metformin 500 mg 2 tablets twice daily  Compliance at present is estimated to be very good.   Follow-up: 1/23/24 @8:30am  PCP Follow-Up: 1/12/24

## 2024-01-09 NOTE — PROGRESS NOTES
Subjective   Patient ID: Karina Zapata is a 58 y.o. female who presents for Diabetes.    Referring Provider: Jacqui Johnston     Diabetes  She presents for her follow-up diabetic visit. She has type 2 diabetes mellitus. There are no diabetic associated symptoms. There are no hypoglycemic complications. Current diabetic treatment includes diet and oral agent (monotherapy). She is compliant with treatment all of the time. She is following a generally healthy diet. She participates in exercise three times a week. An ACE inhibitor/angiotensin II receptor blocker is being taken.     Objective     There were no vitals taken for this visit.     Labs  Lab Results   Component Value Date    CALCIUM 9.7 04/13/2023    CO2 24 04/13/2023    CL 99 04/13/2023    CREATININE 0.67 04/13/2023    GLUCOSE 291 (H) 04/13/2023    K 4.5 04/13/2023     04/13/2023    BUN 17 04/13/2023    ANIONGAP 19 04/13/2023    GFRF >90 04/13/2023     Lab Results   Component Value Date    TRIG 184 (H) 04/13/2023    CHOL 249 (H) 04/13/2023    HDL 72.9 04/13/2023     Lab Results   Component Value Date    HGBA1C 11.9 (A) 04/13/2023       Current Outpatient Medications on File Prior to Visit   Medication Sig Dispense Refill    amLODIPine (Norvasc) 5 mg tablet TAKE ONE TABLET BY MOUTH ONCE DAILY 30 tablet 0    aspirin 81 mg EC tablet Take 1 tablet (81 mg) by mouth once daily.      cholecalciferol (Vitamin D-3) 50 MCG (2000 UT) tablet Vitamin D3 2000 IU take one daily      fluticasone (Flonase) 50 mcg/actuation nasal spray Administer 1 spray into affected nostril(s) once daily.      lisinopril 20 mg tablet Take 1 tablet (20 mg) by mouth once daily. 90 tablet 0    metFORMIN (Glucophage) 500 mg tablet Take 2 tablets (1,000 mg) by mouth in the morning and 2 tablets (1,000 mg) in the evening. Take with meals. 180 tablet 1    rosuvastatin (Crestor) 20 mg tablet Take 1 tablet (20 mg) by mouth once daily. 30 tablet 1    tirzepatide (Mounjaro) 10  mg/0.5 mL pen injector Inject 10 mg under the skin every 7 days. 2 mL 2    [DISCONTINUED] amLODIPine (Norvasc) 5 mg tablet Take 1 tablet (5 mg) by mouth once daily. 30 tablet 0     No current facility-administered medications on file prior to visit.        Assessment/Plan   Problem List Items Addressed This Visit       T2DM (type 2 diabetes mellitus) (CMS/[facility])     Patient's diabetes is poorly controlled with most recent A1c of 11.9% on 4/13/23 (Goal < 7%). She does not check her blood sugar. Shannen is due for updated blood work at her next appointment. She has had a little nausea with Mounjaro 10 mg occasionally, but she thinks it may be stress related.  Continue: Mounjaro 10 mg subcutaneously weekly  Continue: metformin 500 mg 2 tablets twice daily  Compliance at present is estimated to be very good.   Follow-up: 1/23/24 @8:30am  PCP Follow-Up: 1/12/24            Meg LazoD    Continue all meds under the continuation of care with the referring provider and clinical pharmacy team.    Verbal consent to manage patient's drug therapy was obtained from the patient. They were informed they may decline to participate or withdraw from participation in pharmacy services at any time.

## 2024-01-12 ENCOUNTER — TELEMEDICINE (OUTPATIENT)
Dept: PRIMARY CARE | Facility: CLINIC | Age: 59
End: 2024-01-12
Payer: COMMERCIAL

## 2024-01-12 DIAGNOSIS — E11.9 TYPE 2 DIABETES MELLITUS WITHOUT COMPLICATION, WITHOUT LONG-TERM CURRENT USE OF INSULIN (MULTI): ICD-10-CM

## 2024-01-12 DIAGNOSIS — I10 HYPERTENSION, UNSPECIFIED TYPE: ICD-10-CM

## 2024-01-12 DIAGNOSIS — E78.5 HYPERLIPIDEMIA, UNSPECIFIED HYPERLIPIDEMIA TYPE: Primary | ICD-10-CM

## 2024-01-12 PROBLEM — R80.9 ALBUMINURIA: Status: ACTIVE | Noted: 2024-01-12

## 2024-01-12 PROCEDURE — 99213 OFFICE O/P EST LOW 20 MIN: CPT | Performed by: INTERNAL MEDICINE

## 2024-01-12 NOTE — PROGRESS NOTES
Subjective   Patient ID: Marjan Zapata is a 58 y.o. female who presents for Follow-up (Video visit for follow-up ).    Improved lifestyle,  with reduced stress, exercise and healthier diet, Mounjaro is overall well tolerated.    Assessment/Plan   Diagnoses and all orders for this visit:  Hyperlipidemia, unspecified hyperlipidemia type  -     CT cardiac scoring wo IV contrast; Future  -     Lipid panel; Future  Type 2 diabetes mellitus without complication, without long-term current use of insulin (CMS/[facility])  -     CT cardiac scoring wo IV contrast; Future  -     Hemoglobin A1C; Future  -     Albumin, urine, random; Future  Hypertension, unspecified type  -     Comprehensive metabolic panel; Future  -     CBC; Future

## 2024-01-15 ENCOUNTER — PHARMACY VISIT (OUTPATIENT)
Dept: PHARMACY | Facility: CLINIC | Age: 59
End: 2024-01-15
Payer: MEDICARE

## 2024-01-15 PROCEDURE — RXMED WILLOW AMBULATORY MEDICATION CHARGE

## 2024-01-23 ENCOUNTER — APPOINTMENT (OUTPATIENT)
Dept: PHARMACY | Facility: HOSPITAL | Age: 59
End: 2024-01-23
Payer: COMMERCIAL

## 2024-01-30 ENCOUNTER — APPOINTMENT (OUTPATIENT)
Dept: PHARMACY | Facility: HOSPITAL | Age: 59
End: 2024-01-30
Payer: COMMERCIAL

## 2024-02-04 DIAGNOSIS — I10 HYPERTENSION, UNSPECIFIED TYPE: ICD-10-CM

## 2024-02-04 DIAGNOSIS — E78.5 HYPERLIPIDEMIA, UNSPECIFIED HYPERLIPIDEMIA TYPE: ICD-10-CM

## 2024-02-05 RX ORDER — AMLODIPINE BESYLATE 5 MG/1
5 TABLET ORAL DAILY
Qty: 30 TABLET | Refills: 0 | Status: SHIPPED | OUTPATIENT
Start: 2024-02-05 | End: 2024-02-13

## 2024-02-05 RX ORDER — ROSUVASTATIN CALCIUM 20 MG/1
20 TABLET, COATED ORAL DAILY
Qty: 30 TABLET | Refills: 0 | Status: SHIPPED | OUTPATIENT
Start: 2024-02-05 | End: 2024-03-11

## 2024-02-08 ENCOUNTER — APPOINTMENT (OUTPATIENT)
Dept: PHARMACY | Facility: HOSPITAL | Age: 59
End: 2024-02-08
Payer: COMMERCIAL

## 2024-02-08 PROCEDURE — RXMED WILLOW AMBULATORY MEDICATION CHARGE

## 2024-02-10 ENCOUNTER — PHARMACY VISIT (OUTPATIENT)
Dept: PHARMACY | Facility: CLINIC | Age: 59
End: 2024-02-10
Payer: MEDICARE

## 2024-02-12 DIAGNOSIS — I10 HYPERTENSION, UNSPECIFIED TYPE: ICD-10-CM

## 2024-02-13 RX ORDER — AMLODIPINE BESYLATE 5 MG/1
5 TABLET ORAL DAILY
Qty: 30 TABLET | Refills: 0 | Status: SHIPPED | OUTPATIENT
Start: 2024-02-13 | End: 2024-03-11

## 2024-02-15 ENCOUNTER — APPOINTMENT (OUTPATIENT)
Dept: RADIOLOGY | Facility: HOSPITAL | Age: 59
End: 2024-02-15

## 2024-02-15 ENCOUNTER — TELEMEDICINE (OUTPATIENT)
Dept: PRIMARY CARE | Facility: CLINIC | Age: 59
End: 2024-02-15
Payer: COMMERCIAL

## 2024-02-15 DIAGNOSIS — B34.9 VIRAL ILLNESS: ICD-10-CM

## 2024-02-15 DIAGNOSIS — R11.0 NAUSEA: Primary | ICD-10-CM

## 2024-02-15 PROCEDURE — 99213 OFFICE O/P EST LOW 20 MIN: CPT | Performed by: INTERNAL MEDICINE

## 2024-02-15 PROCEDURE — 4010F ACE/ARB THERAPY RXD/TAKEN: CPT | Performed by: INTERNAL MEDICINE

## 2024-02-15 PROCEDURE — 3008F BODY MASS INDEX DOCD: CPT | Performed by: INTERNAL MEDICINE

## 2024-02-15 PROCEDURE — 1036F TOBACCO NON-USER: CPT | Performed by: INTERNAL MEDICINE

## 2024-02-15 RX ORDER — ONDANSETRON 4 MG/1
4 TABLET, ORALLY DISINTEGRATING ORAL EVERY 8 HOURS PRN
Qty: 21 TABLET | Refills: 0 | Status: SHIPPED | OUTPATIENT
Start: 2024-02-15 | End: 2024-02-22

## 2024-02-15 NOTE — PROGRESS NOTES
Subjective   Patient ID: Marjan Zapata is a 59 y.o. female who presents for Fever (Video visit for fever and can't keep food down).  The patient is in the state of [place] during the virtual visit.    HPI   The patient presents with 2 day history of nausea without vomiting, body aches, low grade fever 100'F, generalized malaise. Exposed to coworkers with similar symptoms prior the acute onset.  Tested negative for COVID virus.  Assessment/Plan   Diagnoses and all orders for this visit:  Nausea  -     ondansetron ODT (Zofran-ODT) 4 mg disintegrating tablet; Take 1 tablet (4 mg) by mouth every 8 hours if needed for nausea or vomiting for up to 7 days.  Viral illness  Hydration, rest, Tylenol 500 mg 2 tablets qid PRN, PRN follow up.

## 2024-02-20 ENCOUNTER — LAB (OUTPATIENT)
Dept: LAB | Facility: LAB | Age: 59
End: 2024-02-20
Payer: COMMERCIAL

## 2024-02-20 DIAGNOSIS — I10 HYPERTENSION, UNSPECIFIED TYPE: ICD-10-CM

## 2024-02-20 DIAGNOSIS — E78.5 HYPERLIPIDEMIA, UNSPECIFIED HYPERLIPIDEMIA TYPE: ICD-10-CM

## 2024-02-20 DIAGNOSIS — E11.9 TYPE 2 DIABETES MELLITUS WITHOUT COMPLICATION, WITHOUT LONG-TERM CURRENT USE OF INSULIN (MULTI): ICD-10-CM

## 2024-02-20 LAB
ALBUMIN SERPL BCP-MCNC: 4.1 G/DL (ref 3.4–5)
ALP SERPL-CCNC: 66 U/L (ref 33–110)
ALT SERPL W P-5'-P-CCNC: 19 U/L (ref 7–45)
ANION GAP SERPL CALC-SCNC: 14 MMOL/L (ref 10–20)
AST SERPL W P-5'-P-CCNC: 14 U/L (ref 9–39)
BILIRUB SERPL-MCNC: 0.4 MG/DL (ref 0–1.2)
BUN SERPL-MCNC: 12 MG/DL (ref 6–23)
CALCIUM SERPL-MCNC: 9.8 MG/DL (ref 8.6–10.6)
CHLORIDE SERPL-SCNC: 102 MMOL/L (ref 98–107)
CHOLEST SERPL-MCNC: 121 MG/DL (ref 0–199)
CHOLESTEROL/HDL RATIO: 2.1
CO2 SERPL-SCNC: 26 MMOL/L (ref 21–32)
CREAT SERPL-MCNC: 0.83 MG/DL (ref 0.5–1.05)
CREAT UR-MCNC: 114.5 MG/DL (ref 20–320)
EGFRCR SERPLBLD CKD-EPI 2021: 82 ML/MIN/1.73M*2
ERYTHROCYTE [DISTWIDTH] IN BLOOD BY AUTOMATED COUNT: 12.9 % (ref 11.5–14.5)
EST. AVERAGE GLUCOSE BLD GHB EST-MCNC: 146 MG/DL
GLUCOSE SERPL-MCNC: 138 MG/DL (ref 74–99)
HBA1C MFR BLD: 6.7 %
HCT VFR BLD AUTO: 42 % (ref 36–46)
HDLC SERPL-MCNC: 56.3 MG/DL
HGB BLD-MCNC: 13.2 G/DL (ref 12–16)
LDLC SERPL CALC-MCNC: 43 MG/DL
MCH RBC QN AUTO: 26.6 PG (ref 26–34)
MCHC RBC AUTO-ENTMCNC: 31.4 G/DL (ref 32–36)
MCV RBC AUTO: 85 FL (ref 80–100)
MICROALBUMIN UR-MCNC: 9 MG/L
MICROALBUMIN/CREAT UR: 7.9 UG/MG CREAT
NON HDL CHOLESTEROL: 65 MG/DL (ref 0–149)
NRBC BLD-RTO: 0 /100 WBCS (ref 0–0)
PLATELET # BLD AUTO: 315 X10*3/UL (ref 150–450)
POTASSIUM SERPL-SCNC: 4.3 MMOL/L (ref 3.5–5.3)
PROT SERPL-MCNC: 6.8 G/DL (ref 6.4–8.2)
RBC # BLD AUTO: 4.96 X10*6/UL (ref 4–5.2)
SODIUM SERPL-SCNC: 138 MMOL/L (ref 136–145)
TRIGL SERPL-MCNC: 108 MG/DL (ref 0–149)
VLDL: 22 MG/DL (ref 0–40)
WBC # BLD AUTO: 10 X10*3/UL (ref 4.4–11.3)

## 2024-02-20 PROCEDURE — 85027 COMPLETE CBC AUTOMATED: CPT

## 2024-02-20 PROCEDURE — 82043 UR ALBUMIN QUANTITATIVE: CPT

## 2024-02-20 PROCEDURE — 83036 HEMOGLOBIN GLYCOSYLATED A1C: CPT

## 2024-02-20 PROCEDURE — 80061 LIPID PANEL: CPT

## 2024-02-20 PROCEDURE — 82570 ASSAY OF URINE CREATININE: CPT

## 2024-02-20 PROCEDURE — 80053 COMPREHEN METABOLIC PANEL: CPT

## 2024-02-27 ENCOUNTER — TELEMEDICINE (OUTPATIENT)
Dept: PHARMACY | Facility: HOSPITAL | Age: 59
End: 2024-02-27
Payer: COMMERCIAL

## 2024-02-27 DIAGNOSIS — E11.65 TYPE 2 DIABETES MELLITUS WITH HYPERGLYCEMIA, WITHOUT LONG-TERM CURRENT USE OF INSULIN (MULTI): ICD-10-CM

## 2024-02-27 RX ORDER — TIRZEPATIDE 7.5 MG/.5ML
7.5 INJECTION, SOLUTION SUBCUTANEOUS
Qty: 2 ML | Refills: 0 | Status: SHIPPED | OUTPATIENT
Start: 2024-02-27 | End: 2024-03-26 | Stop reason: DRUGHIGH

## 2024-02-27 ASSESSMENT — ENCOUNTER SYMPTOMS: DIABETIC ASSOCIATED SYMPTOMS: 0

## 2024-02-27 NOTE — PROGRESS NOTES
Subjective   Patient ID: Marjan Zapata is a 59 y.o. female who presents for Diabetes.    Referring Provider: Jacqui Johnston     Diabetes  She presents for her follow-up diabetic visit. She has type 2 diabetes mellitus. There are no diabetic associated symptoms. There are no hypoglycemic complications. Current diabetic treatment includes diet and oral agent (monotherapy). She is compliant with treatment all of the time. She is following a generally healthy diet. She participates in exercise three times a week. An ACE inhibitor/angiotensin II receptor blocker is being taken.     Objective     There were no vitals taken for this visit.     Labs  Lab Results   Component Value Date    BILITOT 0.4 02/20/2024    CALCIUM 9.8 02/20/2024    CO2 26 02/20/2024     02/20/2024    CREATININE 0.83 02/20/2024    GLUCOSE 138 (H) 02/20/2024    ALKPHOS 66 02/20/2024    K 4.3 02/20/2024    PROT 6.8 02/20/2024     02/20/2024    AST 14 02/20/2024    ALT 19 02/20/2024    BUN 12 02/20/2024    ANIONGAP 14 02/20/2024    ALBUMIN 4.1 02/20/2024    GFRF >90 04/13/2023     Lab Results   Component Value Date    TRIG 108 02/20/2024    CHOL 121 02/20/2024    LDLCALC 43 02/20/2024    HDL 56.3 02/20/2024     Lab Results   Component Value Date    HGBA1C 6.7 (H) 02/20/2024       Current Outpatient Medications on File Prior to Visit   Medication Sig Dispense Refill    amLODIPine (Norvasc) 5 mg tablet TAKE ONE TABLET BY MOUTH EVERY DAY 30 tablet 0    aspirin 81 mg EC tablet Take 1 tablet (81 mg) by mouth once daily.      cholecalciferol (Vitamin D-3) 50 MCG (2000 UT) tablet Vitamin D3 2000 IU take one daily      fluticasone (Flonase) 50 mcg/actuation nasal spray Administer 1 spray into affected nostril(s) once daily.      lisinopril 20 mg tablet Take 1 tablet (20 mg) by mouth once daily. 90 tablet 0    metFORMIN (Glucophage) 500 mg tablet Take 2 tablets (1,000 mg) by mouth in the morning and 2 tablets (1,000 mg) in the  evening. Take with meals. 180 tablet 1    [] ondansetron ODT (Zofran-ODT) 4 mg disintegrating tablet Take 1 tablet (4 mg) by mouth every 8 hours if needed for nausea or vomiting for up to 7 days. 21 tablet 0    rosuvastatin (Crestor) 20 mg tablet Take 1 tablet (20 mg) by mouth once daily. 30 tablet 0    [DISCONTINUED] tirzepatide (Mounjaro) 10 mg/0.5 mL pen injector Inject 10 mg under the skin every 7 days. 2 mL 2     No current facility-administered medications on file prior to visit.        Assessment/Plan   Problem List Items Addressed This Visit       T2DM (type 2 diabetes mellitus) (CMS/[facility])     Patient's diabetes is well controlled with most recent A1c of 6.7% on 24 (Goal < 7%). Huge improvement since 2023! She does not check her blood sugar. She continues to have some nausea 2 days after her weekly dose.  Decrease: Mounjaro from 10 mg to 7.5 mg weekly  Continue: metformin 500 mg 2 tablets twice daily  Compliance at present is estimated to be very good.   Follow-up: 3/26/24 @8:30am  PCP Follow-Up: TBD         Relevant Medications    tirzepatide (Mounjaro) 7.5 mg/0.5 mL pen injector    Other Relevant Orders    Follow Up In [facility]       Ana Garland PharmD    Continue all meds under the continuation of care with the referring provider and clinical pharmacy team.    Verbal consent to manage patient's drug therapy was obtained from the patient. They were informed they may decline to participate or withdraw from participation in pharmacy services at any time.

## 2024-02-27 NOTE — ASSESSMENT & PLAN NOTE
Patient's diabetes is well controlled with most recent A1c of 6.7% on 2/20/24 (Goal < 7%). Huge improvement since April 2023! She does not check her blood sugar. She continues to have some nausea 2 days after her weekly dose.  Decrease: Mounjaro from 10 mg to 7.5 mg weekly  Continue: metformin 500 mg 2 tablets twice daily  Compliance at present is estimated to be very good.   Follow-up: 3/26/24 @8:30am  PCP Follow-Up: KENYETTA

## 2024-02-28 PROCEDURE — RXMED WILLOW AMBULATORY MEDICATION CHARGE

## 2024-03-04 ENCOUNTER — PHARMACY VISIT (OUTPATIENT)
Dept: PHARMACY | Facility: CLINIC | Age: 59
End: 2024-03-04
Payer: MEDICARE

## 2024-03-10 DIAGNOSIS — I10 HYPERTENSION, UNSPECIFIED TYPE: ICD-10-CM

## 2024-03-10 DIAGNOSIS — E78.5 HYPERLIPIDEMIA, UNSPECIFIED HYPERLIPIDEMIA TYPE: ICD-10-CM

## 2024-03-11 RX ORDER — LISINOPRIL 20 MG/1
20 TABLET ORAL DAILY
Qty: 90 TABLET | Refills: 0 | Status: SHIPPED | OUTPATIENT
Start: 2024-03-11 | End: 2024-06-11

## 2024-03-11 RX ORDER — ROSUVASTATIN CALCIUM 20 MG/1
20 TABLET, COATED ORAL DAILY
Qty: 90 TABLET | Refills: 0 | Status: SHIPPED | OUTPATIENT
Start: 2024-03-11 | End: 2024-06-11

## 2024-03-11 RX ORDER — AMLODIPINE BESYLATE 5 MG/1
5 TABLET ORAL DAILY
Qty: 90 TABLET | Refills: 0 | Status: SHIPPED | OUTPATIENT
Start: 2024-03-11

## 2024-03-26 ENCOUNTER — PHARMACY VISIT (OUTPATIENT)
Dept: PHARMACY | Facility: CLINIC | Age: 59
End: 2024-03-26
Payer: MEDICARE

## 2024-03-26 ENCOUNTER — TELEMEDICINE (OUTPATIENT)
Dept: PHARMACY | Facility: HOSPITAL | Age: 59
End: 2024-03-26
Payer: COMMERCIAL

## 2024-03-26 DIAGNOSIS — E11.65 TYPE 2 DIABETES MELLITUS WITH HYPERGLYCEMIA, WITHOUT LONG-TERM CURRENT USE OF INSULIN (MULTI): ICD-10-CM

## 2024-03-26 PROCEDURE — RXMED WILLOW AMBULATORY MEDICATION CHARGE

## 2024-03-26 RX ORDER — TIRZEPATIDE 10 MG/.5ML
10 INJECTION, SOLUTION SUBCUTANEOUS
Qty: 4 ML | Refills: 0 | Status: SHIPPED | OUTPATIENT
Start: 2024-03-26 | End: 2024-05-21 | Stop reason: SDUPTHER

## 2024-03-26 ASSESSMENT — ENCOUNTER SYMPTOMS: DIABETIC ASSOCIATED SYMPTOMS: 0

## 2024-03-26 NOTE — ASSESSMENT & PLAN NOTE
Patient's diabetes is well controlled with most recent A1c of 6.7% on 2/20/24 (Goal < 7%). Huge improvement since April 2023! She does not check her blood sugar. She no longer has nausea, which was due to stress.  Increase: Mounjaro from 7.5 mg to 10 mg weekly  Continue: metformin 500 mg 2 tablets twice daily  Compliance at present is estimated to be very good.   Follow-up: 5/21/24 @8:30am  PCP Follow-Up: KENYETTA

## 2024-03-26 NOTE — PROGRESS NOTES
Subjective   Patient ID: Marjan Zapata is a 59 y.o. female who presents for Diabetes.    Referring Provider: Jacqui Johnston     Diabetes  She presents for her follow-up diabetic visit. She has type 2 diabetes mellitus. There are no diabetic associated symptoms. There are no hypoglycemic complications. Current diabetic treatment includes diet and oral agent (monotherapy). She is compliant with treatment all of the time. She is following a generally healthy diet. She participates in exercise three times a week. An ACE inhibitor/angiotensin II receptor blocker is being taken.     Weight:  2/8/24 - 183 lbs  3/26/24 - 177 lbs    Patient previously reported nausea with Mounjaro 7.5 mg, but she has determined that it was due to job stress. She has quit and will start new job next month.    Objective     There were no vitals taken for this visit.     Labs  Lab Results   Component Value Date    BILITOT 0.4 02/20/2024    CALCIUM 9.8 02/20/2024    CO2 26 02/20/2024     02/20/2024    CREATININE 0.83 02/20/2024    GLUCOSE 138 (H) 02/20/2024    ALKPHOS 66 02/20/2024    K 4.3 02/20/2024    PROT 6.8 02/20/2024     02/20/2024    AST 14 02/20/2024    ALT 19 02/20/2024    BUN 12 02/20/2024    ANIONGAP 14 02/20/2024    ALBUMIN 4.1 02/20/2024    GFRF >90 04/13/2023     Lab Results   Component Value Date    TRIG 108 02/20/2024    CHOL 121 02/20/2024    LDLCALC 43 02/20/2024    HDL 56.3 02/20/2024     Lab Results   Component Value Date    HGBA1C 6.7 (H) 02/20/2024       Current Outpatient Medications on File Prior to Visit   Medication Sig Dispense Refill    amLODIPine (Norvasc) 5 mg tablet Take 1 tablet (5 mg) by mouth once daily. 90 tablet 0    aspirin 81 mg EC tablet Take 1 tablet (81 mg) by mouth once daily.      cholecalciferol (Vitamin D-3) 50 MCG (2000 UT) tablet Vitamin D3 2000 IU take one daily      fluticasone (Flonase) 50 mcg/actuation nasal spray Administer 1 spray into affected nostril(s) once  daily.      lisinopril 20 mg tablet Take 1 tablet (20 mg) by mouth once daily. 90 tablet 0    metFORMIN (Glucophage) 500 mg tablet Take 2 tablets (1,000 mg) by mouth in the morning and 2 tablets (1,000 mg) in the evening. Take with meals. 180 tablet 1    rosuvastatin (Crestor) 20 mg tablet TAKE 1 TABLET (20 MG) BY MOUTH ONCE DAILY 90 tablet 0    [DISCONTINUED] tirzepatide (Mounjaro) 7.5 mg/0.5 mL pen injector Inject 7.5 mg under the skin every 7 days. 2 mL 0     No current facility-administered medications on file prior to visit.        Assessment/Plan   Problem List Items Addressed This Visit       T2DM (type 2 diabetes mellitus) (CMS/[facility])     Patient's diabetes is well controlled with most recent A1c of 6.7% on 2/20/24 (Goal < 7%). Huge improvement since April 2023! She does not check her blood sugar. She no longer has nausea, which was due to stress.  Increase: Mounjaro from 7.5 mg to 10 mg weekly  Continue: metformin 500 mg 2 tablets twice daily  Compliance at present is estimated to be very good.   Follow-up: 5/21/24 @8:30am  PCP Follow-Up: TBD         Relevant Medications    tirzepatide (Mounjaro) 10 mg/0.5 mL pen injector    Other Relevant Orders    Follow Up In [facility] Pharmacy       Meg LazoD    Continue all meds under the continuation of care with the referring provider and clinical pharmacy team.    Verbal consent to manage patient's drug therapy was obtained from the patient. They were informed they may decline to participate or withdraw from participation in pharmacy services at any time.

## 2024-03-27 ENCOUNTER — APPOINTMENT (OUTPATIENT)
Dept: RADIOLOGY | Facility: HOSPITAL | Age: 59
End: 2024-03-27
Payer: COMMERCIAL

## 2024-04-15 DIAGNOSIS — E11.9 TYPE 2 DIABETES MELLITUS WITHOUT COMPLICATION, WITHOUT LONG-TERM CURRENT USE OF INSULIN (MULTI): ICD-10-CM

## 2024-04-16 RX ORDER — METFORMIN HYDROCHLORIDE 500 MG/1
1000 TABLET ORAL
Qty: 120 TABLET | Refills: 0 | Status: SHIPPED | OUTPATIENT
Start: 2024-04-16 | End: 2024-06-11

## 2024-05-21 ENCOUNTER — TELEMEDICINE (OUTPATIENT)
Dept: PHARMACY | Facility: HOSPITAL | Age: 59
End: 2024-05-21
Payer: COMMERCIAL

## 2024-05-21 DIAGNOSIS — E11.65 TYPE 2 DIABETES MELLITUS WITH HYPERGLYCEMIA, WITHOUT LONG-TERM CURRENT USE OF INSULIN (MULTI): ICD-10-CM

## 2024-05-21 RX ORDER — TIRZEPATIDE 10 MG/.5ML
10 INJECTION, SOLUTION SUBCUTANEOUS
Qty: 2 ML | Refills: 0 | Status: SHIPPED | OUTPATIENT
Start: 2024-05-21

## 2024-05-21 ASSESSMENT — ENCOUNTER SYMPTOMS: DIABETIC ASSOCIATED SYMPTOMS: 0

## 2024-05-21 NOTE — PROGRESS NOTES
Subjective   Patient ID: Marjan Zapata is a 59 y.o. female who presents for Diabetes.    Referring Provider: Jacqui Johnston     Diabetes  She presents for her follow-up diabetic visit. She has type 2 diabetes mellitus. There are no diabetic associated symptoms. There are no hypoglycemic complications. Current diabetic treatment includes diet and oral agent (monotherapy). She is compliant with treatment all of the time. She is following a generally healthy diet. She participates in exercise three times a week. An ACE inhibitor/angiotensin II receptor blocker is being taken.     Weight:  2/8/24 - 183 lbs  3/26/24 - 177 lbs  5/20/24 - 170 lbs    No longer having nausea or fatigue. Can definitely attribute to previous job stress.     Objective     There were no vitals taken for this visit.     Labs  Lab Results   Component Value Date    BILITOT 0.4 02/20/2024    CALCIUM 9.8 02/20/2024    CO2 26 02/20/2024     02/20/2024    CREATININE 0.83 02/20/2024    GLUCOSE 138 (H) 02/20/2024    ALKPHOS 66 02/20/2024    K 4.3 02/20/2024    PROT 6.8 02/20/2024     02/20/2024    AST 14 02/20/2024    ALT 19 02/20/2024    BUN 12 02/20/2024    ANIONGAP 14 02/20/2024    ALBUMIN 4.1 02/20/2024    GFRF >90 04/13/2023     Lab Results   Component Value Date    TRIG 108 02/20/2024    CHOL 121 02/20/2024    LDLCALC 43 02/20/2024    HDL 56.3 02/20/2024     Lab Results   Component Value Date    HGBA1C 6.7 (H) 02/20/2024       Current Outpatient Medications on File Prior to Visit   Medication Sig Dispense Refill    amLODIPine (Norvasc) 5 mg tablet Take 1 tablet (5 mg) by mouth once daily. 90 tablet 0    aspirin 81 mg EC tablet Take 1 tablet (81 mg) by mouth once daily.      cholecalciferol (Vitamin D-3) 50 MCG (2000 UT) tablet Vitamin D3 2000 IU take one daily      fluticasone (Flonase) 50 mcg/actuation nasal spray Administer 1 spray into affected nostril(s) once daily.      lisinopril 20 mg tablet Take 1 tablet (20  mg) by mouth once daily. 90 tablet 0    metFORMIN (Glucophage) 500 mg tablet Take 2 tablets (1,000 mg) by mouth 2 times a day with meals. ----DUE FOR APPT 120 tablet 0    rosuvastatin (Crestor) 20 mg tablet TAKE 1 TABLET (20 MG) BY MOUTH ONCE DAILY 90 tablet 0    [DISCONTINUED] tirzepatide (Mounjaro) 10 mg/0.5 mL pen injector Inject 10 mg under the skin every 7 days. 4 mL 0     No current facility-administered medications on file prior to visit.        Assessment/Plan   Problem List Items Addressed This Visit       T2DM (type 2 diabetes mellitus) (Multi)     Patient's diabetes is well controlled with most recent A1c of 6.7% on 2/20/24 (Goal < 7%). She does not check her blood sugar. She no longer has nausea, which was due to stress.  Continue: Mounjaro 10 mg weekly  Continue: metformin 500 mg 2 tablets twice daily  Compliance at present is estimated to be very good.   Follow-up: 6/18/24 @8:30am  PCP Follow-Up: TBD         Relevant Medications    tirzepatide (Mounjaro) 10 mg/0.5 mL pen injector    Other Relevant Orders    Follow Up In [facility] - Pharmacy       Meg LazoD    Continue all meds under the continuation of care with the referring provider and clinical pharmacy team.    Verbal consent to manage patient's drug therapy was obtained from the patient. They were informed they may decline to participate or withdraw from participation in pharmacy services at any time.

## 2024-05-21 NOTE — ASSESSMENT & PLAN NOTE
Patient's diabetes is well controlled with most recent A1c of 6.7% on 2/20/24 (Goal < 7%). She does not check her blood sugar. She no longer has nausea, which was due to stress.  Continue: Mounjaro 10 mg weekly  Continue: metformin 500 mg 2 tablets twice daily  Compliance at present is estimated to be very good.   Follow-up: 6/18/24 @8:30am  PCP Follow-Up: KENYETTA

## 2024-05-22 PROCEDURE — RXMED WILLOW AMBULATORY MEDICATION CHARGE

## 2024-05-25 ENCOUNTER — PHARMACY VISIT (OUTPATIENT)
Dept: PHARMACY | Facility: CLINIC | Age: 59
End: 2024-05-25
Payer: COMMERCIAL

## 2024-06-10 DIAGNOSIS — E11.9 TYPE 2 DIABETES MELLITUS WITHOUT COMPLICATION, WITHOUT LONG-TERM CURRENT USE OF INSULIN (MULTI): ICD-10-CM

## 2024-06-10 DIAGNOSIS — I10 HYPERTENSION, UNSPECIFIED TYPE: ICD-10-CM

## 2024-06-10 DIAGNOSIS — E78.5 HYPERLIPIDEMIA, UNSPECIFIED HYPERLIPIDEMIA TYPE: ICD-10-CM

## 2024-06-11 RX ORDER — ROSUVASTATIN CALCIUM 20 MG/1
20 TABLET, COATED ORAL DAILY
Qty: 30 TABLET | Refills: 0 | Status: SHIPPED | OUTPATIENT
Start: 2024-06-11

## 2024-06-11 RX ORDER — LISINOPRIL 20 MG/1
20 TABLET ORAL DAILY
Qty: 30 TABLET | Refills: 0 | Status: SHIPPED | OUTPATIENT
Start: 2024-06-11

## 2024-06-11 RX ORDER — METFORMIN HYDROCHLORIDE 500 MG/1
1000 TABLET ORAL
Qty: 120 TABLET | Refills: 0 | Status: SHIPPED | OUTPATIENT
Start: 2024-06-11

## 2024-06-17 ENCOUNTER — APPOINTMENT (OUTPATIENT)
Dept: PHARMACY | Facility: HOSPITAL | Age: 59
End: 2024-06-17
Payer: COMMERCIAL

## 2024-06-18 ENCOUNTER — APPOINTMENT (OUTPATIENT)
Dept: PHARMACY | Facility: HOSPITAL | Age: 59
End: 2024-06-18
Payer: COMMERCIAL

## 2024-06-18 DIAGNOSIS — E11.65 TYPE 2 DIABETES MELLITUS WITH HYPERGLYCEMIA, WITHOUT LONG-TERM CURRENT USE OF INSULIN (MULTI): ICD-10-CM

## 2024-06-18 PROCEDURE — RXMED WILLOW AMBULATORY MEDICATION CHARGE

## 2024-06-18 RX ORDER — TIRZEPATIDE 12.5 MG/.5ML
12.5 INJECTION, SOLUTION SUBCUTANEOUS
Qty: 2 ML | Refills: 2 | Status: SHIPPED | OUTPATIENT
Start: 2024-06-18

## 2024-06-18 ASSESSMENT — ENCOUNTER SYMPTOMS: DIABETIC ASSOCIATED SYMPTOMS: 0

## 2024-06-18 NOTE — PROGRESS NOTES
Subjective   Patient ID: Marjan Zapata is a 59 y.o. female who presents for Diabetes.    Referring Provider: Jacqui Johnston   Next visit: TBD    Diabetes  She presents for her follow-up diabetic visit. She has type 2 diabetes mellitus. There are no diabetic associated symptoms. There are no hypoglycemic complications. Current diabetic treatment includes diet and oral agent (monotherapy). She is compliant with treatment all of the time. She is following a generally healthy diet. She participates in exercise three times a week. An ACE inhibitor/angiotensin II receptor blocker is being taken.     Weight:  2/8/24 - 183 lbs  3/26/24 - 177 lbs  5/20/24 - 170 lbs  6/18/24 - 170 lbs    Patient had injured herself and didn't work out for a couple weeks. She has been back into exercising for the past 1-2 weeks and has not made progress on weight loss.    Objective     There were no vitals taken for this visit.     Labs  Lab Results   Component Value Date    BILITOT 0.4 02/20/2024    CALCIUM 9.8 02/20/2024    CO2 26 02/20/2024     02/20/2024    CREATININE 0.83 02/20/2024    GLUCOSE 138 (H) 02/20/2024    ALKPHOS 66 02/20/2024    K 4.3 02/20/2024    PROT 6.8 02/20/2024     02/20/2024    AST 14 02/20/2024    ALT 19 02/20/2024    BUN 12 02/20/2024    ANIONGAP 14 02/20/2024    ALBUMIN 4.1 02/20/2024    GFRF >90 04/13/2023     Lab Results   Component Value Date    TRIG 108 02/20/2024    CHOL 121 02/20/2024    LDLCALC 43 02/20/2024    HDL 56.3 02/20/2024     Lab Results   Component Value Date    HGBA1C 6.7 (H) 02/20/2024       Current Outpatient Medications on File Prior to Visit   Medication Sig Dispense Refill    amLODIPine (Norvasc) 5 mg tablet Take 1 tablet (5 mg) by mouth once daily. 90 tablet 0    aspirin 81 mg EC tablet Take 1 tablet (81 mg) by mouth once daily.      cholecalciferol (Vitamin D-3) 50 MCG (2000 UT) tablet Vitamin D3 2000 IU take one daily      fluticasone (Flonase) 50 mcg/actuation  nasal spray Administer 1 spray into affected nostril(s) once daily.      lisinopril 20 mg tablet Take 1 tablet (20 mg) by mouth once daily. ---DUE FOR APPT 30 tablet 0    metFORMIN (Glucophage) 500 mg tablet Take 2 tablets (1,000 mg) by mouth 2 times a day after meals. ----DUE FOR APPT 120 tablet 0    rosuvastatin (Crestor) 20 mg tablet Take 1 tablet (20 mg) by mouth once daily. -----DUE FOR APPT 30 tablet 0    [DISCONTINUED] tirzepatide (Mounjaro) 10 mg/0.5 mL pen injector Inject 10 mg under the skin every 7 days. 2 mL 0     No current facility-administered medications on file prior to visit.        Assessment/Plan   Problem List Items Addressed This Visit       T2DM (type 2 diabetes mellitus) (Multi)     Patient's diabetes is well controlled with most recent A1c of 6.7% on 2/20/24 (Goal < 7%). She does not check her blood sugar. No adverse effects noted. Weight loss progress has stalled and Mounjaro 10 mg is not available at [facility].  Increase: Mounjaro from 10 mg to 12.5 mg weekly  Continue: metformin 500 mg 2 tablets twice daily         Relevant Medications    tirzepatide (Mounjaro) 12.5 mg/0.5 mL pen injector    Other Relevant Orders    Follow Up In [facility] Pharmacy     Follow-up: 7/16/24    Ana Garland, PharmD    Continue all meds under the continuation of care with the referring provider and clinical pharmacy team.    Verbal consent to manage patient's drug therapy was obtained from the patient. They were informed they may decline to participate or withdraw from participation in pharmacy services at any time.

## 2024-06-22 ENCOUNTER — PHARMACY VISIT (OUTPATIENT)
Dept: PHARMACY | Facility: CLINIC | Age: 59
End: 2024-06-22
Payer: COMMERCIAL

## 2024-07-11 DIAGNOSIS — I10 HYPERTENSION, UNSPECIFIED TYPE: ICD-10-CM

## 2024-07-11 DIAGNOSIS — E78.5 HYPERLIPIDEMIA, UNSPECIFIED HYPERLIPIDEMIA TYPE: ICD-10-CM

## 2024-07-11 DIAGNOSIS — E11.9 TYPE 2 DIABETES MELLITUS WITHOUT COMPLICATION, WITHOUT LONG-TERM CURRENT USE OF INSULIN (MULTI): ICD-10-CM

## 2024-07-16 ENCOUNTER — APPOINTMENT (OUTPATIENT)
Dept: PHARMACY | Facility: HOSPITAL | Age: 59
End: 2024-07-16
Payer: COMMERCIAL

## 2024-07-16 DIAGNOSIS — E11.65 TYPE 2 DIABETES MELLITUS WITH HYPERGLYCEMIA, WITHOUT LONG-TERM CURRENT USE OF INSULIN (MULTI): ICD-10-CM

## 2024-07-16 ASSESSMENT — ENCOUNTER SYMPTOMS: DIABETIC ASSOCIATED SYMPTOMS: 0

## 2024-07-16 NOTE — PROGRESS NOTES
Subjective   Patient ID: Marjan Zapata is a 59 y.o. female who presents for Diabetes.    Referring Provider: Jacqui Johnston   Next visit: TBD    Diabetes  She presents for her follow-up diabetic visit. She has type 2 diabetes mellitus. There are no diabetic associated symptoms. There are no hypoglycemic complications. Current diabetic treatment includes diet and oral agent (monotherapy). She is compliant with treatment all of the time. She is following a generally healthy diet. She participates in exercise three times a week. An ACE inhibitor/angiotensin II receptor blocker is being taken.     Weight:  2/8/24 - 183 lbs  3/26/24 - 177 lbs  5/20/24 - 170 lbs  6/18/24 - 170 lbs  7/16/24 - 168    Walking and swimming.    Objective     There were no vitals taken for this visit.     Labs  Lab Results   Component Value Date    BILITOT 0.4 02/20/2024    CALCIUM 9.8 02/20/2024    CO2 26 02/20/2024     02/20/2024    CREATININE 0.83 02/20/2024    GLUCOSE 138 (H) 02/20/2024    ALKPHOS 66 02/20/2024    K 4.3 02/20/2024    PROT 6.8 02/20/2024     02/20/2024    AST 14 02/20/2024    ALT 19 02/20/2024    BUN 12 02/20/2024    ANIONGAP 14 02/20/2024    ALBUMIN 4.1 02/20/2024    GFRF >90 04/13/2023     Lab Results   Component Value Date    TRIG 108 02/20/2024    CHOL 121 02/20/2024    LDLCALC 43 02/20/2024    HDL 56.3 02/20/2024     Lab Results   Component Value Date    HGBA1C 6.7 (H) 02/20/2024       Current Outpatient Medications on File Prior to Visit   Medication Sig Dispense Refill    amLODIPine (Norvasc) 5 mg tablet Take 1 tablet (5 mg) by mouth once daily. 90 tablet 0    aspirin 81 mg EC tablet Take 1 tablet (81 mg) by mouth once daily.      cholecalciferol (Vitamin D-3) 50 MCG (2000 UT) tablet Vitamin D3 2000 IU take one daily      fluticasone (Flonase) 50 mcg/actuation nasal spray Administer 1 spray into affected nostril(s) once daily.      lisinopril 20 mg tablet Take 1 tablet (20 mg) by mouth  once daily. ---DUE FOR APPT 30 tablet 0    metFORMIN (Glucophage) 500 mg tablet Take 2 tablets (1,000 mg) by mouth 2 times a day after meals. ----DUE FOR APPT 120 tablet 0    rosuvastatin (Crestor) 20 mg tablet Take 1 tablet (20 mg) by mouth once daily. -----DUE FOR APPT 30 tablet 0    tirzepatide (Mounjaro) 12.5 mg/0.5 mL pen injector Inject 12.5 mg under the skin every 7 days. 2 mL 2     No current facility-administered medications on file prior to visit.        Assessment/Plan   Problem List Items Addressed This Visit       T2DM (type 2 diabetes mellitus) (Multi)     Patient's diabetes is well controlled with most recent A1C of 6.7% on 2/20/24 (Goal < 7%). She does not check her blood sugar. No adverse effects noted unless she does not stay hydrated.  Continue: Mounjaro 12.5 mg weekly  Continue: metformin 500 mg 2 tablets twice daily         Relevant Orders    Follow Up In [facility] - Pharmacy     Follow-up: 9/3/24    Meg LazoD    Continue all meds under the continuation of care with the referring provider and clinical pharmacy team.    Verbal consent to manage patient's drug therapy was obtained from the patient. They were informed they may decline to participate or withdraw from participation in pharmacy services at any time.

## 2024-07-16 NOTE — ASSESSMENT & PLAN NOTE
Patient's diabetes is well controlled with most recent A1C of 6.7% on 2/20/24 (Goal < 7%). She does not check her blood sugar. No adverse effects noted unless she does not stay hydrated.  Continue: Mounjaro 12.5 mg weekly  Continue: metformin 500 mg 2 tablets twice daily

## 2024-07-17 RX ORDER — AMLODIPINE BESYLATE 5 MG/1
5 TABLET ORAL DAILY
Qty: 90 TABLET | Refills: 0 | Status: SHIPPED | OUTPATIENT
Start: 2024-07-17

## 2024-07-17 RX ORDER — LISINOPRIL 20 MG/1
20 TABLET ORAL DAILY
Qty: 90 TABLET | Refills: 0 | Status: SHIPPED | OUTPATIENT
Start: 2024-07-17

## 2024-07-17 RX ORDER — METFORMIN HYDROCHLORIDE 500 MG/1
TABLET ORAL
Qty: 360 TABLET | Refills: 0 | Status: SHIPPED | OUTPATIENT
Start: 2024-07-17

## 2024-07-17 RX ORDER — ROSUVASTATIN CALCIUM 20 MG/1
20 TABLET, COATED ORAL DAILY
Qty: 90 TABLET | Refills: 0 | Status: SHIPPED | OUTPATIENT
Start: 2024-07-17

## 2024-07-20 DIAGNOSIS — I10 HYPERTENSION, UNSPECIFIED TYPE: ICD-10-CM

## 2024-07-20 DIAGNOSIS — E11.9 TYPE 2 DIABETES MELLITUS WITHOUT COMPLICATION, WITHOUT LONG-TERM CURRENT USE OF INSULIN (MULTI): ICD-10-CM

## 2024-07-20 DIAGNOSIS — E78.5 HYPERLIPIDEMIA, UNSPECIFIED HYPERLIPIDEMIA TYPE: ICD-10-CM

## 2024-07-22 RX ORDER — ROSUVASTATIN CALCIUM 20 MG/1
TABLET, COATED ORAL
Qty: 30 TABLET | Refills: 0 | Status: SHIPPED | OUTPATIENT
Start: 2024-07-22

## 2024-07-22 RX ORDER — LISINOPRIL 20 MG/1
TABLET ORAL
Qty: 30 TABLET | Refills: 0 | Status: SHIPPED | OUTPATIENT
Start: 2024-07-22

## 2024-07-22 RX ORDER — METFORMIN HYDROCHLORIDE 500 MG/1
TABLET ORAL
Qty: 120 TABLET | Refills: 0 | Status: SHIPPED | OUTPATIENT
Start: 2024-07-22

## 2024-07-25 PROCEDURE — RXMED WILLOW AMBULATORY MEDICATION CHARGE

## 2024-07-29 ENCOUNTER — PHARMACY VISIT (OUTPATIENT)
Dept: PHARMACY | Facility: CLINIC | Age: 59
End: 2024-07-29
Payer: COMMERCIAL

## 2024-08-19 DIAGNOSIS — E11.9 TYPE 2 DIABETES MELLITUS WITHOUT COMPLICATION, WITHOUT LONG-TERM CURRENT USE OF INSULIN (MULTI): ICD-10-CM

## 2024-08-19 DIAGNOSIS — E78.5 HYPERLIPIDEMIA, UNSPECIFIED HYPERLIPIDEMIA TYPE: ICD-10-CM

## 2024-08-19 DIAGNOSIS — I10 HYPERTENSION, UNSPECIFIED TYPE: ICD-10-CM

## 2024-08-20 RX ORDER — ROSUVASTATIN CALCIUM 20 MG/1
20 TABLET, COATED ORAL DAILY
Qty: 30 TABLET | Refills: 0 | Status: SHIPPED | OUTPATIENT
Start: 2024-08-20

## 2024-08-20 RX ORDER — LISINOPRIL 20 MG/1
20 TABLET ORAL DAILY
Qty: 30 TABLET | Refills: 0 | Status: SHIPPED | OUTPATIENT
Start: 2024-08-20

## 2024-08-20 RX ORDER — METFORMIN HYDROCHLORIDE 500 MG/1
1000 TABLET ORAL
Qty: 120 TABLET | Refills: 0 | Status: SHIPPED | OUTPATIENT
Start: 2024-08-20

## 2024-08-20 RX ORDER — AMLODIPINE BESYLATE 5 MG/1
5 TABLET ORAL DAILY
Qty: 30 TABLET | Refills: 0 | Status: SHIPPED | OUTPATIENT
Start: 2024-08-20

## 2024-08-23 PROCEDURE — RXMED WILLOW AMBULATORY MEDICATION CHARGE

## 2024-08-24 ENCOUNTER — PHARMACY VISIT (OUTPATIENT)
Dept: PHARMACY | Facility: CLINIC | Age: 59
End: 2024-08-24
Payer: COMMERCIAL

## 2024-09-03 ENCOUNTER — APPOINTMENT (OUTPATIENT)
Dept: PHARMACY | Facility: HOSPITAL | Age: 59
End: 2024-09-03
Payer: COMMERCIAL

## 2024-09-03 DIAGNOSIS — E11.65 TYPE 2 DIABETES MELLITUS WITH HYPERGLYCEMIA, WITHOUT LONG-TERM CURRENT USE OF INSULIN (MULTI): ICD-10-CM

## 2024-09-03 RX ORDER — TIRZEPATIDE 12.5 MG/.5ML
12.5 INJECTION, SOLUTION SUBCUTANEOUS
Qty: 2 ML | Refills: 1 | Status: SHIPPED | OUTPATIENT
Start: 2024-09-03

## 2024-09-03 ASSESSMENT — ENCOUNTER SYMPTOMS: DIABETIC ASSOCIATED SYMPTOMS: 0

## 2024-09-03 NOTE — PROGRESS NOTES
Subjective   Patient ID: Marjan Zapata is a 59 y.o. female who presents for Diabetes.    Referring Provider: Jacqui Johnston   Next visit: 9/11/24    Diabetes  She presents for her follow-up diabetic visit. She has type 2 diabetes mellitus. There are no diabetic associated symptoms. There are no hypoglycemic complications. Current diabetic treatment includes diet and oral agent (monotherapy). She is compliant with treatment all of the time. She is following a generally healthy diet. She participates in exercise three times a week. An ACE inhibitor/angiotensin II receptor blocker is being taken.     Weight:  2/8/24 - 183 lbs  3/26/24 - 177 lbs  5/20/24 - 170 lbs  6/18/24 - 170 lbs  7/16/24 - 168 lbs    Objective     There were no vitals taken for this visit.     Labs  Lab Results   Component Value Date    BILITOT 0.4 02/20/2024    CALCIUM 9.8 02/20/2024    CO2 26 02/20/2024     02/20/2024    CREATININE 0.83 02/20/2024    GLUCOSE 138 (H) 02/20/2024    ALKPHOS 66 02/20/2024    K 4.3 02/20/2024    PROT 6.8 02/20/2024     02/20/2024    AST 14 02/20/2024    ALT 19 02/20/2024    BUN 12 02/20/2024    ANIONGAP 14 02/20/2024    ALBUMIN 4.1 02/20/2024    GFRF >90 04/13/2023     Lab Results   Component Value Date    TRIG 108 02/20/2024    CHOL 121 02/20/2024    LDLCALC 43 02/20/2024    HDL 56.3 02/20/2024     Lab Results   Component Value Date    HGBA1C 6.7 (H) 02/20/2024       Current Outpatient Medications on File Prior to Visit   Medication Sig Dispense Refill    amLODIPine (Norvasc) 5 mg tablet Take 1 tablet (5 mg) by mouth once daily. 30 tablet 0    aspirin 81 mg EC tablet Take 1 tablet (81 mg) by mouth once daily.      cholecalciferol (Vitamin D-3) 50 MCG (2000 UT) tablet Vitamin D3 2000 IU take one daily      fluticasone (Flonase) 50 mcg/actuation nasal spray Administer 1 spray into affected nostril(s) once daily.      lisinopril 20 mg tablet Take 1 tablet (20 mg) by mouth once daily. 30  tablet 0    metFORMIN (Glucophage) 500 mg tablet Take 2 tablets (1,000 mg) by mouth 2 times a day after meals. 120 tablet 0    rosuvastatin (Crestor) 20 mg tablet Take 1 tablet (20 mg) by mouth once daily. 30 tablet 0    [DISCONTINUED] tirzepatide (Mounjaro) 12.5 mg/0.5 mL pen injector Inject 12.5 mg under the skin every 7 days. 2 mL 2     No current facility-administered medications on file prior to visit.        Assessment/Plan   Problem List Items Addressed This Visit       T2DM (type 2 diabetes mellitus) (Multi)     Patient's diabetes is well controlled with most recent A1C of 6.7% on 2/20/24 (Goal < 7%). She does not check her blood sugar. No adverse effects noted unless she does not stay hydrated. No significant weight loss since last visit, but she also went on vacation.  Continue: Mounjaro 12.5 mg weekly per patient preference.  Continue: metformin 500 mg 2 tablets twice daily         Relevant Medications    tirzepatide (Mounjaro) 12.5 mg/0.5 mL pen injector    Other Relevant Orders    Follow Up In [facility] - Pharmacy     Follow-up: 10/1/24    Ana Garland, PharmD    Continue all meds under the continuation of care with the referring provider and clinical pharmacy team.    Verbal consent to manage patient's drug therapy was obtained from the patient. They were informed they may decline to participate or withdraw from participation in pharmacy services at any time.

## 2024-09-03 NOTE — ASSESSMENT & PLAN NOTE
Patient's diabetes is well controlled with most recent A1C of 6.7% on 2/20/24 (Goal < 7%). She does not check her blood sugar. No adverse effects noted unless she does not stay hydrated. No significant weight loss since last visit, but she also went on vacation.  Continue: Mounjaro 12.5 mg weekly per patient preference.  Continue: metformin 500 mg 2 tablets twice daily

## 2024-09-10 ASSESSMENT — PROMIS GLOBAL HEALTH SCALE
RATE_SOCIAL_SATISFACTION: GOOD
RATE_AVERAGE_FATIGUE: MILD
RATE_GENERAL_HEALTH: GOOD
EMOTIONAL_PROBLEMS: RARELY
RATE_QUALITY_OF_LIFE: VERY GOOD
CARRYOUT_PHYSICAL_ACTIVITIES: COMPLETELY
RATE_MENTAL_HEALTH: GOOD
RATE_PHYSICAL_HEALTH: FAIR
CARRYOUT_SOCIAL_ACTIVITIES: GOOD
RATE_AVERAGE_PAIN: 1

## 2024-09-11 ENCOUNTER — LAB (OUTPATIENT)
Dept: LAB | Facility: LAB | Age: 59
End: 2024-09-11
Payer: COMMERCIAL

## 2024-09-11 ENCOUNTER — APPOINTMENT (OUTPATIENT)
Dept: PRIMARY CARE | Facility: CLINIC | Age: 59
End: 2024-09-11
Payer: COMMERCIAL

## 2024-09-11 VITALS
BODY MASS INDEX: 33.68 KG/M2 | WEIGHT: 183 LBS | SYSTOLIC BLOOD PRESSURE: 130 MMHG | HEART RATE: 90 BPM | OXYGEN SATURATION: 98 % | HEIGHT: 62 IN | DIASTOLIC BLOOD PRESSURE: 80 MMHG

## 2024-09-11 DIAGNOSIS — Z11.59 NEED FOR HEPATITIS C SCREENING TEST: ICD-10-CM

## 2024-09-11 DIAGNOSIS — E11.65 TYPE 2 DIABETES MELLITUS WITH HYPERGLYCEMIA, WITHOUT LONG-TERM CURRENT USE OF INSULIN (MULTI): ICD-10-CM

## 2024-09-11 DIAGNOSIS — E11.9 TYPE 2 DIABETES MELLITUS WITHOUT COMPLICATION, WITHOUT LONG-TERM CURRENT USE OF INSULIN (MULTI): ICD-10-CM

## 2024-09-11 DIAGNOSIS — Z00.00 ANNUAL PHYSICAL EXAM: Primary | ICD-10-CM

## 2024-09-11 DIAGNOSIS — I10 HYPERTENSION, UNSPECIFIED TYPE: ICD-10-CM

## 2024-09-11 DIAGNOSIS — Z12.11 ENCOUNTER FOR SCREENING FOR MALIGNANT NEOPLASM OF COLON: ICD-10-CM

## 2024-09-11 DIAGNOSIS — Z12.31 ENCOUNTER FOR SCREENING MAMMOGRAM FOR MALIGNANT NEOPLASM OF BREAST: ICD-10-CM

## 2024-09-11 DIAGNOSIS — E78.5 HYPERLIPIDEMIA, UNSPECIFIED HYPERLIPIDEMIA TYPE: ICD-10-CM

## 2024-09-11 DIAGNOSIS — Z00.00 ANNUAL PHYSICAL EXAM: ICD-10-CM

## 2024-09-11 DIAGNOSIS — Z23 IMMUNIZATION DUE: ICD-10-CM

## 2024-09-11 LAB
ANION GAP SERPL CALC-SCNC: 15 MMOL/L (ref 10–20)
BUN SERPL-MCNC: 15 MG/DL (ref 6–23)
CALCIUM SERPL-MCNC: 9.7 MG/DL (ref 8.6–10.6)
CHLORIDE SERPL-SCNC: 103 MMOL/L (ref 98–107)
CO2 SERPL-SCNC: 25 MMOL/L (ref 21–32)
CREAT SERPL-MCNC: 0.79 MG/DL (ref 0.5–1.05)
CREAT UR-MCNC: 62.9 MG/DL (ref 20–320)
EGFRCR SERPLBLD CKD-EPI 2021: 87 ML/MIN/1.73M*2
ERYTHROCYTE [DISTWIDTH] IN BLOOD BY AUTOMATED COUNT: 12.7 % (ref 11.5–14.5)
EST. AVERAGE GLUCOSE BLD GHB EST-MCNC: 137 MG/DL
GLUCOSE SERPL-MCNC: 123 MG/DL (ref 74–99)
HBA1C MFR BLD: 6.4 %
HCT VFR BLD AUTO: 40.1 % (ref 36–46)
HCV AB SER QL: NONREACTIVE
HGB BLD-MCNC: 13.2 G/DL (ref 12–16)
MCH RBC QN AUTO: 26.7 PG (ref 26–34)
MCHC RBC AUTO-ENTMCNC: 32.9 G/DL (ref 32–36)
MCV RBC AUTO: 81 FL (ref 80–100)
MICROALBUMIN UR-MCNC: <7 MG/L
MICROALBUMIN/CREAT UR: NORMAL MG/G{CREAT}
NRBC BLD-RTO: 0 /100 WBCS (ref 0–0)
PLATELET # BLD AUTO: 298 X10*3/UL (ref 150–450)
POTASSIUM SERPL-SCNC: 4.8 MMOL/L (ref 3.5–5.3)
RBC # BLD AUTO: 4.95 X10*6/UL (ref 4–5.2)
SODIUM SERPL-SCNC: 138 MMOL/L (ref 136–145)
WBC # BLD AUTO: 9.7 X10*3/UL (ref 4.4–11.3)

## 2024-09-11 PROCEDURE — 90471 IMMUNIZATION ADMIN: CPT | Performed by: INTERNAL MEDICINE

## 2024-09-11 PROCEDURE — 1036F TOBACCO NON-USER: CPT | Performed by: INTERNAL MEDICINE

## 2024-09-11 PROCEDURE — 3044F HG A1C LEVEL LT 7.0%: CPT | Performed by: INTERNAL MEDICINE

## 2024-09-11 PROCEDURE — 86803 HEPATITIS C AB TEST: CPT

## 2024-09-11 PROCEDURE — 3008F BODY MASS INDEX DOCD: CPT | Performed by: INTERNAL MEDICINE

## 2024-09-11 PROCEDURE — 90732 PPSV23 VACC 2 YRS+ SUBQ/IM: CPT | Performed by: INTERNAL MEDICINE

## 2024-09-11 PROCEDURE — 90656 IIV3 VACC NO PRSV 0.5 ML IM: CPT | Performed by: INTERNAL MEDICINE

## 2024-09-11 PROCEDURE — 3079F DIAST BP 80-89 MM HG: CPT | Performed by: INTERNAL MEDICINE

## 2024-09-11 PROCEDURE — 80048 BASIC METABOLIC PNL TOTAL CA: CPT

## 2024-09-11 PROCEDURE — 82570 ASSAY OF URINE CREATININE: CPT

## 2024-09-11 PROCEDURE — 99396 PREV VISIT EST AGE 40-64: CPT | Performed by: INTERNAL MEDICINE

## 2024-09-11 PROCEDURE — 83036 HEMOGLOBIN GLYCOSYLATED A1C: CPT

## 2024-09-11 PROCEDURE — 90472 IMMUNIZATION ADMIN EACH ADD: CPT | Performed by: INTERNAL MEDICINE

## 2024-09-11 PROCEDURE — 36415 COLL VENOUS BLD VENIPUNCTURE: CPT

## 2024-09-11 PROCEDURE — 3048F LDL-C <100 MG/DL: CPT | Performed by: INTERNAL MEDICINE

## 2024-09-11 PROCEDURE — 85027 COMPLETE CBC AUTOMATED: CPT

## 2024-09-11 PROCEDURE — 82043 UR ALBUMIN QUANTITATIVE: CPT

## 2024-09-11 PROCEDURE — 3075F SYST BP GE 130 - 139MM HG: CPT | Performed by: INTERNAL MEDICINE

## 2024-09-11 PROCEDURE — 3061F NEG MICROALBUMINURIA REV: CPT | Performed by: INTERNAL MEDICINE

## 2024-09-11 PROCEDURE — 4010F ACE/ARB THERAPY RXD/TAKEN: CPT | Performed by: INTERNAL MEDICINE

## 2024-09-11 RX ORDER — LISINOPRIL 20 MG/1
20 TABLET ORAL DAILY
Qty: 90 TABLET | Refills: 1 | Status: SHIPPED | OUTPATIENT
Start: 2024-09-11

## 2024-09-11 RX ORDER — ROSUVASTATIN CALCIUM 20 MG/1
20 TABLET, COATED ORAL NIGHTLY
Qty: 90 TABLET | Refills: 3 | Status: SHIPPED | OUTPATIENT
Start: 2024-09-11

## 2024-09-11 RX ORDER — AMLODIPINE BESYLATE 5 MG/1
5 TABLET ORAL DAILY
Qty: 90 TABLET | Refills: 1 | Status: SHIPPED | OUTPATIENT
Start: 2024-09-11

## 2024-09-11 RX ORDER — METFORMIN HYDROCHLORIDE 500 MG/1
1000 TABLET ORAL
Qty: 180 TABLET | Refills: 1 | Status: SHIPPED | OUTPATIENT
Start: 2024-09-11

## 2024-09-11 ASSESSMENT — PATIENT HEALTH QUESTIONNAIRE - PHQ9
2. FEELING DOWN, DEPRESSED OR HOPELESS: NOT AT ALL
SUM OF ALL RESPONSES TO PHQ9 QUESTIONS 1 AND 2: 0
1. LITTLE INTEREST OR PLEASURE IN DOING THINGS: NOT AT ALL

## 2024-09-11 NOTE — PROGRESS NOTES
"Subjective   Patient ID: Marjan Zapata is a 59 y.o. female who presents for Annual Exam (Patient here for complete physical exam).    HPI   The patient presents for an annual wellness exam with no concerns.    Review of Systems  Negative   Objective   /80   Pulse 90   Ht 1.575 m (5' 2\")   Wt 83 kg (183 lb)   SpO2 98%   BMI 33.47 kg/m²     Physical Exam  NAD. Cooperative.  HEENT: WNL  Neck: WNL  Lungs CTA  Heart: RRR  Abdomen: WNL  Musculoskeletal system: WNL  Neurologic exam: WNL    Assessment/Plan   Diagnoses and all orders for this visit:  Annual physical exam  -     Basic Metabolic Panel; Future  -     Hemoglobin A1C; Future  -     CBC; Future  Hypertension, unspecified type  -     amLODIPine (Norvasc) 5 mg tablet; Take 1 tablet (5 mg) by mouth once daily.  -     lisinopril 20 mg tablet; Take 1 tablet (20 mg) by mouth once daily.  -     Albumin-Creatinine Ratio, Urine Random; Future  -     CT cardiac scoring wo IV contrast; Future  Type 2 diabetes mellitus without complication, without long-term current use of insulin (Multi)  -     metFORMIN (Glucophage) 500 mg tablet; Take 2 tablets (1,000 mg) by mouth 2 times a day after meals.  -     Hemoglobin A1C; Future  -     Albumin-Creatinine Ratio, Urine Random; Future  -     CT cardiac scoring wo IV contrast; Future  Hyperlipidemia, unspecified hyperlipidemia type  -     rosuvastatin (Crestor) 20 mg tablet; Take 1 tablet (20 mg) by mouth once daily at bedtime.  -     CT cardiac scoring wo IV contrast; Future  Encounter for screening for malignant neoplasm of colon  -     Colonoscopy Screening; Average Risk Patient; Future  Encounter for screening mammogram for malignant neoplasm of breast  -     BI mammo bilateral screening tomosynthesis; Future  Need for hepatitis C screening test  -     Hepatitis C Antibody; Future  Immunization due  -     Pneumococcal polysaccharide vaccine, 23-valent, age 2 years and older (PNEUMOVAX 23)  -     Flu vaccine, " trivalent, preservative free, age 6 months and greater (Fluraix/Fluzone/Flulaval)    Discussed and recommended Zoster vaccine, Tdap, COVID booster.  GYN exam update.

## 2024-09-19 PROCEDURE — RXMED WILLOW AMBULATORY MEDICATION CHARGE

## 2024-09-20 ENCOUNTER — PHARMACY VISIT (OUTPATIENT)
Dept: PHARMACY | Facility: CLINIC | Age: 59
End: 2024-09-20
Payer: COMMERCIAL

## 2024-10-01 ENCOUNTER — APPOINTMENT (OUTPATIENT)
Dept: PHARMACY | Facility: HOSPITAL | Age: 59
End: 2024-10-01
Payer: COMMERCIAL

## 2024-10-01 DIAGNOSIS — E11.9 TYPE 2 DIABETES MELLITUS WITHOUT COMPLICATION, WITHOUT LONG-TERM CURRENT USE OF INSULIN (MULTI): Primary | ICD-10-CM

## 2024-10-01 DIAGNOSIS — E11.65 TYPE 2 DIABETES MELLITUS WITH HYPERGLYCEMIA, WITHOUT LONG-TERM CURRENT USE OF INSULIN: ICD-10-CM

## 2024-10-01 NOTE — ASSESSMENT & PLAN NOTE
Patient's goal A1c is < 7%.  Is pt at goal? Yes, most recent A1C was 6.4%     Rationale for plan: A1C is well controlled and improved compared to the reading prior in February.    Medication Changes:  CONTINUE:  Mounjaro 12.5 mg weekly  STOP  Metformin 500 mg twice daily

## 2024-10-01 NOTE — PROGRESS NOTES
"  Clinical Pharmacy Appointment    Patient ID: Karina Zapata \"Fuentes" is a 59 y.o. female who presents for Diabetes.    Pt is here for Follow Up appointment.     Referring Provider: Jacqui Johnston, *  PCP: Jacqui Johnston MD   Last visit with PCP: 9/11/24   Next visit with PCP: TBD    Subjective     Interval History  Had PCP visit and completed lab work.    HPI  DIABETES MELLITUS TYPE 2:    Does patient follow with Endocrinology: No  Last optometry exam: N/A  Most recent visit in Podiatry: N/A-- patient denies sores or cuts on feet today      176 at home    Current diabetic medications include:  Metformin 500 mg two tablets twice daily  Mounjaro 12.5 mg weekly    Clarifications to above regimen: metformin 500 twice daily  Adverse Effects: None    Glucose Readings:  Glucometer/CGM Type: None  Patient tests BG 0 times per day    Any episodes of hypoglycemia? No.  Did patient treat episode of hypoglycemia appropriately? N/A  Does the patient have a prescription for ready-to-use Glucagon? Not on insulin  Does pt have proteinuria? No    Lifestyle:  Physical Activity: walking and swimming    Weight:  2/8/24 - 183 lbs  3/26/24 - 177 lbs  5/20/24 - 170 lbs  6/18/24 - 170 lbs  7/16/24 - 168 lbs  10/1/24 - 176 lbs    Secondary Prevention:  Statin? Yes  ACE-I/ARB? Yes  Aspirin? Yes    Pertinent PMH Review:  PMH of Pancreatitis: No  PMH of Retinopathy: No  PMH of Urinary Tract Infections: No  PMH of MTC: No     Drug Interactions  No relevant drug interactions were noted.    Medication System Management  Patient's preferred pharmacy: [facility] for Mounjaro, [facility] for everything else  Adherence/Organization: No issues  Affordability/Accessibility: No issues      Objective   Allergies   Allergen Reactions    Meperidine Anaphylaxis    Shellfish Containing Products Hives     Social History     Social History Narrative    Not on file      Medication Review  Current Outpatient Medications "   Medication Instructions    amLODIPine (NORVASC) 5 mg, oral, Daily    aspirin 81 mg EC tablet 1 tablet, oral, Daily    cholecalciferol (Vitamin D-3) 50 MCG (2000 UT) tablet Vitamin D3 2000 IU take one daily    fluticasone (Flonase) 50 mcg/actuation nasal spray 1 spray, nasal, Daily    lisinopril 20 mg, oral, Daily    metFORMIN (GLUCOPHAGE) 1,000 mg, oral, 2 times daily after meals    Mounjaro 12.5 mg, subcutaneous, Every 7 days    rosuvastatin (CRESTOR) 20 mg, oral, Nightly      Vitals  BP Readings from Last 2 Encounters:   09/11/24 130/80     BMI Readings from Last 1 Encounters:   09/11/24 33.47 kg/m²      Labs  A1C  Lab Results   Component Value Date    HGBA1C 6.4 (H) 09/11/2024    HGBA1C 6.7 (H) 02/20/2024    HGBA1C 11.9 (A) 04/13/2023     BMP  Lab Results   Component Value Date    CALCIUM 9.7 09/11/2024     09/11/2024    K 4.8 09/11/2024    CO2 25 09/11/2024     09/11/2024    BUN 15 09/11/2024    CREATININE 0.79 09/11/2024    EGFR 87 09/11/2024     LFTs  Lab Results   Component Value Date    ALT 19 02/20/2024    AST 14 02/20/2024    ALKPHOS 66 02/20/2024    BILITOT 0.4 02/20/2024     FLP  Lab Results   Component Value Date    TRIG 108 02/20/2024    CHOL 121 02/20/2024    LDLF 139 (H) 04/13/2023    LDLCALC 43 02/20/2024    HDL 56.3 02/20/2024     Urine Microalbumin  Lab Results   Component Value Date    MICROALBCREA  09/11/2024      Comment:      One or more analytes used in this calculation is outside of the analytical measurement range. Calculation cannot be performed.     Weight Management  Wt Readings from Last 3 Encounters:   09/11/24 83 kg (183 lb)   04/13/23 92.1 kg (203 lb)      There is no height or weight on file to calculate BMI.     Assessment/Plan   Problem List Items Addressed This Visit       T2DM (type 2 diabetes mellitus) (Multi) - Primary     Patient's goal A1c is < 7%.  Is pt at goal? Yes, most recent A1C was 6.4%     Rationale for plan: A1C is well controlled and improved compared  to the reading prior in February.    Medication Changes:  CONTINUE:  Mounjaro 12.5 mg weekly  STOP  Metformin 500 mg twice daily         Relevant Orders    Follow Up In [facility] - Pharmacy     Clinical Pharmacist follow-up: 11/12/24, Telehealth visit    Continue all meds under the continuation of care with the referring provider and clinical pharmacy team.    Thank you,  Ana Garland  Clinical Pharmacist  [phone]    Verbal consent to manage patient's drug therapy was obtained from the patient. They were informed they may decline to participate or withdraw from participation in pharmacy services at any time.

## 2024-10-07 ENCOUNTER — OFFICE VISIT (OUTPATIENT)
Dept: URGENT CARE | Age: 59
End: 2024-10-07
Payer: COMMERCIAL

## 2024-10-07 ENCOUNTER — HOSPITAL ENCOUNTER (OUTPATIENT)
Dept: RADIOLOGY | Facility: CLINIC | Age: 59
Discharge: HOME | End: 2024-10-07
Payer: COMMERCIAL

## 2024-10-07 VITALS
HEIGHT: 62 IN | WEIGHT: 184 LBS | BODY MASS INDEX: 33.86 KG/M2 | SYSTOLIC BLOOD PRESSURE: 145 MMHG | DIASTOLIC BLOOD PRESSURE: 83 MMHG | HEART RATE: 95 BPM | OXYGEN SATURATION: 99 % | TEMPERATURE: 97.6 F

## 2024-10-07 VITALS — BODY MASS INDEX: 30.18 KG/M2 | WEIGHT: 164 LBS | HEIGHT: 62 IN

## 2024-10-07 DIAGNOSIS — J20.9 ACUTE BRONCHITIS, UNSPECIFIED ORGANISM: Primary | ICD-10-CM

## 2024-10-07 DIAGNOSIS — Z12.31 ENCOUNTER FOR SCREENING MAMMOGRAM FOR MALIGNANT NEOPLASM OF BREAST: ICD-10-CM

## 2024-10-07 DIAGNOSIS — R05.9 COUGH, UNSPECIFIED TYPE: ICD-10-CM

## 2024-10-07 LAB
POC BINAX EXPIRATION: NORMAL
POC BINAX NOW COVID SERIAL NUMBER: NORMAL
POC RAPID INFLUENZA A: NEGATIVE
POC RAPID INFLUENZA B: NEGATIVE
POC SARS-COV-2 AG BINAX: NORMAL

## 2024-10-07 PROCEDURE — 77067 SCR MAMMO BI INCL CAD: CPT

## 2024-10-07 RX ORDER — PREDNISONE 20 MG/1
40 TABLET ORAL DAILY
Qty: 8 TABLET | Refills: 0 | Status: SHIPPED | OUTPATIENT
Start: 2024-10-07 | End: 2024-10-11

## 2024-10-07 ASSESSMENT — PAIN SCALES - GENERAL: PAINLEVEL: 2

## 2024-10-07 NOTE — PROGRESS NOTES
"Subjective   Patient ID: Karina Zapata \"Fuentes" is a 59 y.o. female. They present today with a chief complaint of Congestion Chest (Patient sinus infection x5days with chest congestion.  ).    History of Present Illness  Reports predominately dry cough, chest congestion x2 days after having nasal congestion, sinus pressure, mild sore throat x5 days. States her cough had green mucus this morning upon waking. Denies CP, SOB, leg swelling. Has h/o asthma but has only used her inhaler once, prn. Is taking Coricidin with some relief.           Past Medical History  Allergies as of 10/07/2024 - Reviewed 10/07/2024   Allergen Reaction Noted    Meperidine Anaphylaxis 10/26/2018    Shellfish containing products Hives 03/25/2023       (Not in a hospital admission)       Past Medical History:   Diagnosis Date    Other specified health status     No pertinent past medical history       Past Surgical History:   Procedure Laterality Date    BREAST BIOPSY      OOPHORECTOMY  1994    OTHER SURGICAL HISTORY  03/21/2019    Cholecystectomy laparoscopic    OTHER SURGICAL HISTORY  03/21/2019    Breast reduction    REDUCTION MAMMAPLASTY          reports that she has never smoked. She has never used smokeless tobacco. She reports current alcohol use. She reports that she does not use drugs.    Review of Systems  Pertinent systems reviewed and were negative unless otherwise stated in HPI.    Objective    Vitals:    10/07/24 1237   BP: 145/83   Pulse: 95   Temp: 36.4 °C (97.6 °F)   SpO2: 99%   Weight: 83.5 kg (184 lb)   Height: 1.575 m (5' 2\")     No LMP recorded. Patient is postmenopausal.    Physical Exam  Constitutional:       General: She is not in acute distress.  HENT:      Right Ear: Tympanic membrane, ear canal and external ear normal.      Left Ear: Tympanic membrane, ear canal and external ear normal.      Nose: No congestion.      Mouth/Throat:      Mouth: Mucous membranes are moist.      Pharynx: No oropharyngeal " exudate or posterior oropharyngeal erythema.   Eyes:      Conjunctiva/sclera: Conjunctivae normal.   Cardiovascular:      Rate and Rhythm: Normal rate and regular rhythm.      Heart sounds: Murmur heard.   Pulmonary:      Effort: Pulmonary effort is normal. No respiratory distress.      Breath sounds: No wheezing, rhonchi or rales.      Comments: Normal egophany  Skin:     Findings: No rash.         Diagnostic study results (if any) were reviewed by Lencho Mathews PA-C.    Assessment/Plan   Allergies, medications, history, and pertinent labs/EKGs/imaging reviewed by Lencho Mathews PA-C.     Medical Decision Making  Murmur is chronic, noted 2019, not likely contributing to patient's symptoms today. Low concern for pneumonia in absence of fever, focal auscultory findings, SOB, CP. Short course prednisone may help chest congestion, cough. A1c 6.4 3wk ago.    Orders and Diagnoses  Diagnoses and all orders for this visit:  Acute bronchitis, unspecified organism  Cough, unspecified type  -     POCT Covid-19 Rapid Antigen  -     POCT Influenza A/B manually resulted      Medical Admin Record      Disposition: Home    Electronically signed by Lencho Mathews PA-C

## 2024-10-14 PROCEDURE — RXMED WILLOW AMBULATORY MEDICATION CHARGE

## 2024-10-19 ENCOUNTER — PHARMACY VISIT (OUTPATIENT)
Dept: PHARMACY | Facility: CLINIC | Age: 59
End: 2024-10-19
Payer: COMMERCIAL

## 2024-11-07 NOTE — ASSESSMENT & PLAN NOTE
Patient's diabetes is well controlled with most recent A1c of 6.7% on 2/20/24 (Goal < 7%). She does not check her blood sugar. No adverse effects noted. Weight loss progress has stalled and Mounjaro 10 mg is not available at [facility].  Increase: Mounjaro from 10 mg to 12.5 mg weekly  Continue: metformin 500 mg 2 tablets twice daily  
Never smoker

## 2024-11-12 ENCOUNTER — APPOINTMENT (OUTPATIENT)
Dept: PHARMACY | Facility: HOSPITAL | Age: 59
End: 2024-11-12
Payer: COMMERCIAL

## 2024-11-12 DIAGNOSIS — E11.65 TYPE 2 DIABETES MELLITUS WITH HYPERGLYCEMIA, WITHOUT LONG-TERM CURRENT USE OF INSULIN: ICD-10-CM

## 2024-11-12 DIAGNOSIS — E11.9 TYPE 2 DIABETES MELLITUS WITHOUT COMPLICATION, WITHOUT LONG-TERM CURRENT USE OF INSULIN (MULTI): ICD-10-CM

## 2024-11-12 PROCEDURE — RXMED WILLOW AMBULATORY MEDICATION CHARGE

## 2024-11-12 RX ORDER — TIRZEPATIDE 12.5 MG/.5ML
12.5 INJECTION, SOLUTION SUBCUTANEOUS
Qty: 2 ML | Refills: 2 | Status: SHIPPED | OUTPATIENT
Start: 2024-11-12

## 2024-11-12 NOTE — ASSESSMENT & PLAN NOTE
Patient's goal A1c is < 7%.  Is pt at goal? Yes, most recent A1C was 6.4%     Rationale for plan: A1C is well controlled and improved compared to the reading prior in February. Patient is continuing to lose weight. Continue Mounjaro to maintain blood sugar and aid weight loss.    Medication Changes:  CONTINUE:  Mounjaro 12.5 mg weekly

## 2024-11-12 NOTE — PROGRESS NOTES
"  Clinical Pharmacy Appointment    Patient ID: Karina Zapata \"Fuentes" is a 59 y.o. female who presents for Diabetes.    Pt is here for Follow Up appointment.     Referring Provider: Jacqui Johnston, *  PCP: Jacqui Johnston MD   Last visit with PCP: 9/11/24   Next visit with PCP: TBD    Subjective     Interval History  Started weight training.    HPI  DIABETES MELLITUS TYPE 2:    Does patient follow with Endocrinology: No  Last optometry exam: N/A  Most recent visit in Podiatry: N/A-- patient denies sores or cuts on feet today      Current diabetic medications include:  Mounjaro 12.5 mg weekly    Adverse Effects: None    Glucose Readings:  Glucometer/CGM Type: None  Patient tests BG 0 times per day    Any episodes of hypoglycemia? No.  Did patient treat episode of hypoglycemia appropriately? N/A  Does the patient have a prescription for ready-to-use Glucagon? Not on insulin  Does pt have proteinuria? No    Lifestyle:  Physical Activity: walking, swimming, weight training    Weight:  2/8/24 - 183 lbs  3/26/24 - 177 lbs  5/20/24 - 170 lbs  6/18/24 - 170 lbs  7/16/24 - 168 lbs  10/1/24 - 176 lbs  11/12/24 - 173 lbs, but lost 4 inches around waist in the past month    Secondary Prevention:  Statin? Yes  ACE-I/ARB? Yes  Aspirin? Yes    Pertinent PMH Review:  PMH of Pancreatitis: No  PMH of Retinopathy: No  PMH of Urinary Tract Infections: No  PMH of MTC: No     Drug Interactions  No relevant drug interactions were noted.    Medication System Management  Patient's preferred pharmacy: [facility] for Moun[facility]o, [facility] for everything else  Adherence/Organization: No issues  Affordability/Accessibility: No issues      Objective   Allergies   Allergen Reactions    Meperidine Anaphylaxis    Shellfish Containing Products Hives     Social History     Social History Narrative    Not on file      Medication Review  Current Outpatient Medications   Medication Instructions    amLODIPine (NORVASC) " 5 mg, oral, Daily    aspirin 81 mg EC tablet 1 tablet, oral, Daily    cholecalciferol (Vitamin D-3) 50 MCG (2000 UT) tablet Vitamin D3 2000 IU take one daily    fluticasone (Flonase) 50 mcg/actuation nasal spray 1 spray, nasal, Daily    lisinopril 20 mg, oral, Daily    metFORMIN (GLUCOPHAGE) 1,000 mg, oral, 2 times daily after meals    Mounjaro 12.5 mg, subcutaneous, Every 7 days    rosuvastatin (CRESTOR) 20 mg, oral, Nightly      Vitals  BP Readings from Last 2 Encounters:   10/07/24 145/83   09/11/24 130/80     BMI Readings from Last 1 Encounters:   10/07/24 30.00 kg/m²      Labs  A1C  Lab Results   Component Value Date    HGBA1C 6.4 (H) 09/11/2024    HGBA1C 6.7 (H) 02/20/2024    HGBA1C 11.9 (A) 04/13/2023     BMP  Lab Results   Component Value Date    CALCIUM 9.7 09/11/2024     09/11/2024    K 4.8 09/11/2024    CO2 25 09/11/2024     09/11/2024    BUN 15 09/11/2024    CREATININE 0.79 09/11/2024    EGFR 87 09/11/2024     LFTs  Lab Results   Component Value Date    ALT 19 02/20/2024    AST 14 02/20/2024    ALKPHOS 66 02/20/2024    BILITOT 0.4 02/20/2024     FLP  Lab Results   Component Value Date    TRIG 108 02/20/2024    CHOL 121 02/20/2024    LDLF 139 (H) 04/13/2023    LDLCALC 43 02/20/2024    HDL 56.3 02/20/2024     Urine Microalbumin  Lab Results   Component Value Date    MICROALBCREA  09/11/2024      Comment:      One or more analytes used in this calculation is outside of the analytical measurement range. Calculation cannot be performed.     Weight Management  Wt Readings from Last 3 Encounters:   10/07/24 74.4 kg (164 lb)   10/07/24 83.5 kg (184 lb)   09/11/24 83 kg (183 lb)      There is no height or weight on file to calculate BMI.     Assessment/Plan   Problem List Items Addressed This Visit       T2DM (type 2 diabetes mellitus) (Multi)     Patient's goal A1c is < 7%.  Is pt at goal? Yes, most recent A1C was 6.4%     Rationale for plan: A1C is well controlled and improved compared to the  reading prior in February. Patient is continuing to lose weight. Continue Mounjaro to maintain blood sugar and aid weight loss.    Medication Changes:  CONTINUE:  Mounjaro 12.5 mg weekly         Relevant Medications    tirzepatide (Mounjaro) 12.5 mg/0.5 mL pen injector    Other Relevant Orders    Referral to Clinical Pharmacy     Clinical Pharmacist follow-up: 1/7/25, Telehealth visit    Continue all meds under the continuation of care with the referring provider and clinical pharmacy team.    Thank you,  Ana Garland  Clinical Pharmacist  [phone]    Verbal consent to manage patient's drug therapy was obtained from the patient. They were informed they may decline to participate or withdraw from participation in pharmacy services at any time.

## 2024-11-16 ENCOUNTER — PHARMACY VISIT (OUTPATIENT)
Dept: PHARMACY | Facility: CLINIC | Age: 59
End: 2024-11-16
Payer: COMMERCIAL

## 2024-12-07 PROCEDURE — RXMED WILLOW AMBULATORY MEDICATION CHARGE

## 2024-12-16 ENCOUNTER — PHARMACY VISIT (OUTPATIENT)
Dept: PHARMACY | Facility: CLINIC | Age: 59
End: 2024-12-16
Payer: COMMERCIAL

## 2025-01-06 PROCEDURE — RXMED WILLOW AMBULATORY MEDICATION CHARGE

## 2025-01-07 ENCOUNTER — APPOINTMENT (OUTPATIENT)
Dept: PHARMACY | Facility: HOSPITAL | Age: 60
End: 2025-01-07
Payer: COMMERCIAL

## 2025-01-07 DIAGNOSIS — E11.9 TYPE 2 DIABETES MELLITUS WITHOUT COMPLICATION, WITHOUT LONG-TERM CURRENT USE OF INSULIN (MULTI): ICD-10-CM

## 2025-01-07 NOTE — ASSESSMENT & PLAN NOTE
Patient's goal A1c is < 7%.  Is pt at goal? Yes, most recent A1C was 6.4%     A1C is well controlled and improved compared to the reading prior in February. Patient is continuing to lose weight. She reports a few incidents of hypoglycemia, which she attributes to inconsistent eating around the holidays. We will continue Mounjaro 12.5 mg for another month and patient will be more mindful of her eating. Will discuss decreasing dose if needed at next visit.    Medication Changes:  CONTINUE:  Mounjaro 12.5 mg weekly

## 2025-01-07 NOTE — PROGRESS NOTES
"  Clinical Pharmacy Appointment    Patient ID: Karina Zapata \"Marjan\" is a 59 y.o. female who presents for Diabetes.    Pt is here for Follow Up appointment.     Referring Provider: Jacqui Johnston, *  PCP: Jacqui Johnston MD   Last visit with PCP: 9/11/24   Next visit with PCP: TBD    Subjective     HPI  DIABETES MELLITUS TYPE 2:    Does patient follow with Endocrinology: No  Last optometry exam: N/A  Most recent visit in Podiatry: N/A-- patient denies sores or cuts on feet today      Current diabetic medications include:  Mounjaro 12.5 mg weekly    Adverse Effects: None    Glucose Readings:  Glucometer/CGM Type: None  Patient tests BG 0 times per day    Any episodes of hypoglycemia? Yes.  Did patient treat episode of hypoglycemia appropriately? Yes  Does the patient have a prescription for ready-to-use Glucagon? Not on insulin  Does pt have proteinuria? No    Lifestyle:  Physical Activity: walking, swimming, weight training    Weight:  2/8/24 - 183 lbs  3/26/24 - 177 lbs  5/20/24 - 170 lbs  6/18/24 - 170 lbs  7/16/24 - 168 lbs  10/1/24 - 176 lbs  11/12/24 - 173 lbs, but lost 4 inches around waist in the past month  1/7/25 - 168.2 lbs     Secondary Prevention:  Statin? Yes  ACE-I/ARB? Yes  Aspirin? Yes    Pertinent PMH Review:  PMH of Pancreatitis: No  PMH of Retinopathy: No  PMH of Urinary Tract Infections: No  PMH of MTC: No     Drug Interactions  No relevant drug interactions were noted.    Medication System Management  Patient's preferred pharmacy: [facility] for Mounjaro, [facility] for everything else  Adherence/Organization: No issues  Affordability/Accessibility: No issues      Objective   Allergies   Allergen Reactions    Meperidine Anaphylaxis    Shellfish Containing Products Hives     Social History     Social History Narrative    Not on file      Medication Review  Current Outpatient Medications   Medication Instructions    amLODIPine (NORVASC) 5 mg, oral, Daily    " aspirin 81 mg EC tablet 1 tablet, oral, Daily    cholecalciferol (Vitamin D-3) 50 MCG (2000 UT) tablet Vitamin D3 2000 IU take one daily    fluticasone (Flonase) 50 mcg/actuation nasal spray 1 spray, nasal, Daily    lisinopril 20 mg, oral, Daily    metFORMIN (GLUCOPHAGE) 1,000 mg, oral, 2 times daily after meals    Mounjaro 12.5 mg, subcutaneous, Every 7 days    rosuvastatin (CRESTOR) 20 mg, oral, Nightly      Vitals  BP Readings from Last 2 Encounters:   10/07/24 145/83   09/11/24 130/80     BMI Readings from Last 1 Encounters:   10/07/24 30.00 kg/m²      Labs  A1C  Lab Results   Component Value Date    HGBA1C 6.4 (H) 09/11/2024    HGBA1C 6.7 (H) 02/20/2024    HGBA1C 11.9 (A) 04/13/2023     BMP  Lab Results   Component Value Date    CALCIUM 9.7 09/11/2024     09/11/2024    K 4.8 09/11/2024    CO2 25 09/11/2024     09/11/2024    BUN 15 09/11/2024    CREATININE 0.79 09/11/2024    EGFR 87 09/11/2024     LFTs  Lab Results   Component Value Date    ALT 19 02/20/2024    AST 14 02/20/2024    ALKPHOS 66 02/20/2024    BILITOT 0.4 02/20/2024     FLP  Lab Results   Component Value Date    TRIG 108 02/20/2024    CHOL 121 02/20/2024    LDLF 139 (H) 04/13/2023    LDLCALC 43 02/20/2024    HDL 56.3 02/20/2024     Urine Microalbumin  Lab Results   Component Value Date    MICROALBCREA  09/11/2024      Comment:      One or more analytes used in this calculation is outside of the analytical measurement range. Calculation cannot be performed.     Weight Management  Wt Readings from Last 3 Encounters:   10/07/24 74.4 kg (164 lb)   10/07/24 83.5 kg (184 lb)   09/11/24 83 kg (183 lb)      There is no height or weight on file to calculate BMI.     Assessment/Plan   Problem List Items Addressed This Visit       T2DM (type 2 diabetes mellitus) (Multi)     Patient's goal A1c is < 7%.  Is pt at goal? Yes, most recent A1C was 6.4%     A1C is well controlled and improved compared to the reading prior in February. Patient is  continuing to lose weight. She reports a few incidents of hypoglycemia, which she attributes to inconsistent eating around the holidays. We will continue Mounjaro 12.5 mg for another month and patient will be more mindful of her eating. Will discuss decreasing dose if needed at next visit.    Medication Changes:  CONTINUE:  Mounjaro 12.5 mg weekly          Clinical Pharmacist follow-up: 2/4/25, Telehealth visit    Continue all meds under the continuation of care with the referring provider and clinical pharmacy team.    Thank you,  Ana Garland  Clinical Pharmacist  [phone]    Verbal consent to manage patient's drug therapy was obtained from the patient. They were informed they may decline to participate or withdraw from participation in pharmacy services at any time.

## 2025-01-11 ENCOUNTER — PHARMACY VISIT (OUTPATIENT)
Dept: PHARMACY | Facility: CLINIC | Age: 60
End: 2025-01-11
Payer: COMMERCIAL

## 2025-02-04 ENCOUNTER — TELEMEDICINE (OUTPATIENT)
Dept: PHARMACY | Facility: HOSPITAL | Age: 60
End: 2025-02-04
Payer: COMMERCIAL

## 2025-02-04 DIAGNOSIS — E11.9 TYPE 2 DIABETES MELLITUS WITHOUT COMPLICATION, WITHOUT LONG-TERM CURRENT USE OF INSULIN (MULTI): Primary | ICD-10-CM

## 2025-02-04 DIAGNOSIS — E11.9 TYPE 2 DIABETES MELLITUS WITHOUT COMPLICATION, WITHOUT LONG-TERM CURRENT USE OF INSULIN (MULTI): ICD-10-CM

## 2025-02-04 DIAGNOSIS — E11.65 TYPE 2 DIABETES MELLITUS WITH HYPERGLYCEMIA, WITHOUT LONG-TERM CURRENT USE OF INSULIN: ICD-10-CM

## 2025-02-04 PROCEDURE — RXMED WILLOW AMBULATORY MEDICATION CHARGE

## 2025-02-04 RX ORDER — TIRZEPATIDE 10 MG/.5ML
10 INJECTION, SOLUTION SUBCUTANEOUS
Qty: 2 ML | Refills: 2 | Status: SHIPPED | OUTPATIENT
Start: 2025-02-04

## 2025-02-04 RX ORDER — TIRZEPATIDE 12.5 MG/.5ML
12.5 INJECTION, SOLUTION SUBCUTANEOUS
Qty: 2 ML | Refills: 2 | OUTPATIENT
Start: 2025-02-04

## 2025-02-04 NOTE — ASSESSMENT & PLAN NOTE
Patient's goal A1c is < 7%.  Is pt at goal? Yes, most recent A1C was 6.4%     A1C is well controlled and improved compared to the reading prior in February. Patient is continuing to lose weight. She reports a couple more incidents of hypoglycemia since last visit, which she attributes to intense exercise. She has been eating more consistently, but physical activity has been at a high level. We will decrease Mounjaro and follow up in one month.    Medication Changes:  DECREASE:  Mounjaro to 10 mg weekly  
83

## 2025-02-04 NOTE — PROGRESS NOTES
"  Clinical Pharmacy Appointment    Patient ID: Karina Zapata \"Marjan\" is a 59 y.o. female who presents for No chief complaint on file..    Pt is here for Follow Up appointment.     Referring Provider: Jacqui Johnston, *  PCP: Jacqui Johnston MD   Last visit with PCP: 9/11/24   Next visit with PCP: TBD    Subjective     HPI  DIABETES MELLITUS TYPE 2:    Does patient follow with Endocrinology: No  Last optometry exam: N/A  Most recent visit in Podiatry: N/A-- patient denies sores or cuts on feet today      Current diabetic medications include:  Mounjaro 12.5 mg weekly    Adverse Effects: None    Glucose Readings:  Glucometer/CGM Type: None  Patient tests BG 0 times per day    Any episodes of hypoglycemia? Yes.  Did patient treat episode of hypoglycemia appropriately? Yes  Does the patient have a prescription for ready-to-use Glucagon? Not on insulin  Does pt have proteinuria? No    Lifestyle:  Physical Activity: walking, swimming, weight training    Weight:  2/8/24 - 183 lbs  3/26/24 - 177 lbs  5/20/24 - 170 lbs  6/18/24 - 170 lbs  7/16/24 - 168 lbs  10/1/24 - 176 lbs  11/12/24 - 173 lbs, but lost 4 inches around waist in the past month  1/7/25 - 168.2 lbs   2/4/25 - 165 lbs    Secondary Prevention:  Statin? Yes  ACE-I/ARB? Yes  Aspirin? Yes    Pertinent PMH Review:  PMH of Pancreatitis: No  PMH of Retinopathy: No  PMH of Urinary Tract Infections: No  PMH of MTC: No     Drug Interactions  No relevant drug interactions were noted.    Medication System Management  Patient's preferred pharmacy: [facility] for Mounjaro, [facility] for everything else  Adherence/Organization: No issues  Affordability/Accessibility: No issues      Objective   Allergies   Allergen Reactions    Meperidine Anaphylaxis    Shellfish Containing Products Hives     Social History     Social History Narrative    Not on file      Medication Review  Current Outpatient Medications   Medication Instructions    " amLODIPine (NORVASC) 5 mg, oral, Daily    aspirin 81 mg EC tablet 1 tablet, oral, Daily    cholecalciferol (Vitamin D-3) 50 MCG (2000 UT) tablet Vitamin D3 2000 IU take one daily    fluticasone (Flonase) 50 mcg/actuation nasal spray 1 spray, nasal, Daily    lisinopril 20 mg, oral, Daily    metFORMIN (GLUCOPHAGE) 1,000 mg, oral, 2 times daily after meals    Mounjaro 10 mg, subcutaneous, Every 7 days    rosuvastatin (CRESTOR) 20 mg, oral, Nightly      Vitals  BP Readings from Last 2 Encounters:   10/07/24 145/83   09/11/24 130/80     BMI Readings from Last 1 Encounters:   10/07/24 30.00 kg/m²      Labs  A1C  Lab Results   Component Value Date    HGBA1C 6.4 (H) 09/11/2024    HGBA1C 6.7 (H) 02/20/2024    HGBA1C 11.9 (A) 04/13/2023     BMP  Lab Results   Component Value Date    CALCIUM 9.7 09/11/2024     09/11/2024    K 4.8 09/11/2024    CO2 25 09/11/2024     09/11/2024    BUN 15 09/11/2024    CREATININE 0.79 09/11/2024    EGFR 87 09/11/2024     LFTs  Lab Results   Component Value Date    ALT 19 02/20/2024    AST 14 02/20/2024    ALKPHOS 66 02/20/2024    BILITOT 0.4 02/20/2024     FLP  Lab Results   Component Value Date    TRIG 108 02/20/2024    CHOL 121 02/20/2024    LDLF 139 (H) 04/13/2023    LDLCALC 43 02/20/2024    HDL 56.3 02/20/2024     Urine Microalbumin  Lab Results   Component Value Date    MICROALBCREA  09/11/2024      Comment:      One or more analytes used in this calculation is outside of the analytical measurement range. Calculation cannot be performed.     Weight Management  Wt Readings from Last 3 Encounters:   10/07/24 74.4 kg (164 lb)   10/07/24 83.5 kg (184 lb)   09/11/24 83 kg (183 lb)      There is no height or weight on file to calculate BMI.     Assessment/Plan   Problem List Items Addressed This Visit       T2DM (type 2 diabetes mellitus) (Multi)     Patient's goal A1c is < 7%.  Is pt at goal? Yes, most recent A1C was 6.4%     A1C is well controlled and improved compared to the reading  prior in February. Patient is continuing to lose weight. She reports a couple more incidents of hypoglycemia since last visit, which she attributes to intense exercise. She has been eating more consistently, but physical activity has been at a high level. We will decrease Mounjaro and follow up in one month.    Medication Changes:  DECREASE:  Mounjaro to 10 mg weekly         Relevant Medications    tirzepatide (Mounjaro) 10 mg/0.5 mL pen injector    Other Relevant Orders    Referral to Clinical Pharmacy     Clinical Pharmacist follow-up: 3/4/25, Telehealth visit    Continue all meds under the continuation of care with the referring provider and clinical pharmacy team.    Thank you,  Ana Garland, PharmD  Clinical Pharmacist  [phone]    Verbal consent to manage patient's drug therapy was obtained from the patient. They were informed they may decline to participate or withdraw from participation in pharmacy services at any time.

## 2025-02-07 ENCOUNTER — PHARMACY VISIT (OUTPATIENT)
Dept: PHARMACY | Facility: CLINIC | Age: 60
End: 2025-02-07
Payer: COMMERCIAL

## 2025-02-28 NOTE — PROGRESS NOTES
"  Clinical Pharmacy Appointment    Patient ID: Karina Zapata \"Marjan\" is a 60 y.o. female who presents for Diabetes.    Pt is here for Follow Up appointment.     Referring Provider: Jacqui Johnston, *  PCP: Jacqui Johnston MD   Last visit with PCP: 9/11/24   Next visit with PCP: TBD    Subjective   HPI  DIABETES MELLITUS TYPE 2:    Does patient follow with Endocrinology: No  Last optometry exam: April 2024  Most recent visit in Podiatry: N/A-- patient denies sores or cuts on feet today      Current diabetic medications include:  Mounjaro 10 mg weekly    Adverse Effects: None    Glucose Readings:  Glucometer/CGM Type: None  Patient tests BG 0 times per day    Any episodes of hypoglycemia? None.    Did patient treat episode of hypoglycemia appropriately? N/A  Does the patient have a prescription for ready-to-use Glucagon? Not on insulin  Does pt have proteinuria? No    Lifestyle:  Physical Activity: walking, swimming, weight training    Weight:  2/8/24 - 183 lbs  3/26/24 - 177 lbs  5/20/24 - 170 lbs  6/18/24 - 170 lbs  7/16/24 - 168 lbs  10/1/24 - 176 lbs  11/12/24 - 173 lbs, but lost 4 inches around waist in the past month  1/7/25 - 168.2 lbs   2/4/25 - 165 lbs  3/5/25 - 165 lbs    Secondary Prevention:  Statin? Yes  ACE-I/ARB? Yes  Aspirin? Yes    Pertinent PMH Review:  PMH of Pancreatitis: No  PMH of Retinopathy: No  PMH of Urinary Tract Infections: No  PMH of MTC: No     Drug Interactions  No relevant drug interactions were noted.    Medication System Management  Patient's preferred pharmacy: [facility] for Mounjaro, [facility] for everything else  Adherence/Organization: No issues  Affordability/Accessibility: No issues    Objective   Allergies   Allergen Reactions    Meperidine Anaphylaxis    Shellfish Containing Products Hives     Social History     Social History Narrative    Not on file      Medication Review  Current Outpatient Medications   Medication Instructions    " amLODIPine (NORVASC) 5 mg, oral, Daily    aspirin 81 mg EC tablet 1 tablet, oral, Daily    cholecalciferol (Vitamin D-3) 50 MCG (2000 UT) tablet Vitamin D3 2000 IU take one daily    fluticasone (Flonase) 50 mcg/actuation nasal spray 1 spray, nasal, Daily    lisinopril 20 mg, oral, Daily    metFORMIN (GLUCOPHAGE) 1,000 mg, oral, 2 times daily after meals    Mounjaro 10 mg, subcutaneous, Every 7 days    rosuvastatin (CRESTOR) 20 mg, oral, Nightly      Vitals  BP Readings from Last 2 Encounters:   10/07/24 145/83   09/11/24 130/80     BMI Readings from Last 1 Encounters:   10/07/24 30.00 kg/m²      Labs  A1C  Lab Results   Component Value Date    HGBA1C 6.4 (H) 09/11/2024    HGBA1C 6.7 (H) 02/20/2024    HGBA1C 11.9 (A) 04/13/2023     BMP  Lab Results   Component Value Date    CALCIUM 9.7 09/11/2024     09/11/2024    K 4.8 09/11/2024    CO2 25 09/11/2024     09/11/2024    BUN 15 09/11/2024    CREATININE 0.79 09/11/2024    EGFR 87 09/11/2024     LFTs  Lab Results   Component Value Date    ALT 19 02/20/2024    AST 14 02/20/2024    ALKPHOS 66 02/20/2024    BILITOT 0.4 02/20/2024     FLP  Lab Results   Component Value Date    TRIG 108 02/20/2024    CHOL 121 02/20/2024    LDLF 139 (H) 04/13/2023    LDLCALC 43 02/20/2024    HDL 56.3 02/20/2024     Urine Microalbumin  Lab Results   Component Value Date    MICROALBCREA  09/11/2024      Comment:      One or more analytes used in this calculation is outside of the analytical measurement range. Calculation cannot be performed.     Weight Management  Wt Readings from Last 3 Encounters:   10/07/24 74.4 kg (164 lb)   10/07/24 83.5 kg (184 lb)   09/11/24 83 kg (183 lb)      There is no height or weight on file to calculate BMI.     Assessment/Plan   Problem List Items Addressed This Visit       T2DM (type 2 diabetes mellitus) (Multi)     Patient's goal A1c is < 7%.  Is pt at goal? Yes, most recent A1C was 6.4%     A1C is well controlled and improved compared to the reading  prior in February. Will order labs since it has been about 6 months since last set. Will get updated A1C and lipid panel. Patient's weight was stable in the past month on decreased dose of Mounjaro. She reports feeling some food cravings, which are forcing her to be more mindful of her food choices. No hypoglycemic incidences in the past month. Will continue current Mounjaro dose and assess weight loss and labs at next visit.    Medication Changes:  CONTINUE:  Mounjaro 10 mg weekly         Relevant Orders    Referral to Clinical Pharmacy    Hemoglobin A1c    Comprehensive Metabolic Panel    Lipid panel     Clinical Pharmacist follow-up: 4/16/25, Telehealth visit    Continue all meds under the continuation of care with the referring provider and clinical pharmacy team.    Thank you,  Ana Garland, PharmD  Clinical Pharmacist  [phone]    Verbal consent to manage patient's drug therapy was obtained from the patient. They were informed they may decline to participate or withdraw from participation in pharmacy services at any time.

## 2025-03-04 ENCOUNTER — APPOINTMENT (OUTPATIENT)
Dept: PHARMACY | Facility: HOSPITAL | Age: 60
End: 2025-03-04
Payer: COMMERCIAL

## 2025-03-05 ENCOUNTER — APPOINTMENT (OUTPATIENT)
Dept: PHARMACY | Facility: HOSPITAL | Age: 60
End: 2025-03-05
Payer: COMMERCIAL

## 2025-03-05 DIAGNOSIS — E11.9 TYPE 2 DIABETES MELLITUS WITHOUT COMPLICATION, WITHOUT LONG-TERM CURRENT USE OF INSULIN (MULTI): ICD-10-CM

## 2025-03-05 NOTE — ASSESSMENT & PLAN NOTE
Patient's goal A1c is < 7%.  Is pt at goal? Yes, most recent A1C was 6.4%     A1C is well controlled and improved compared to the reading prior in February. Will order labs since it has been about 6 months since last set. Will get updated A1C and lipid panel. Patient's weight was stable in the past month on decreased dose of Mounjaro. She reports feeling some food cravings, which are forcing her to be more mindful of her food choices. No hypoglycemic incidences in the past month. Will continue current Mounjaro dose and assess weight loss and labs at next visit.    Medication Changes:  CONTINUE:  Mounjaro 10 mg weekly

## 2025-03-08 ENCOUNTER — PHARMACY VISIT (OUTPATIENT)
Dept: PHARMACY | Facility: CLINIC | Age: 60
End: 2025-03-08
Payer: COMMERCIAL

## 2025-03-08 PROCEDURE — RXMED WILLOW AMBULATORY MEDICATION CHARGE

## 2025-03-12 DIAGNOSIS — E11.9 TYPE 2 DIABETES MELLITUS WITHOUT COMPLICATION, WITHOUT LONG-TERM CURRENT USE OF INSULIN (MULTI): ICD-10-CM

## 2025-03-14 DIAGNOSIS — I10 HYPERTENSION, UNSPECIFIED TYPE: ICD-10-CM

## 2025-03-15 RX ORDER — LISINOPRIL 20 MG/1
20 TABLET ORAL DAILY
Qty: 90 TABLET | Refills: 1 | Status: SHIPPED | OUTPATIENT
Start: 2025-03-15

## 2025-03-15 RX ORDER — AMLODIPINE BESYLATE 5 MG/1
5 TABLET ORAL DAILY
Qty: 90 TABLET | Refills: 1 | Status: SHIPPED | OUTPATIENT
Start: 2025-03-15

## 2025-03-31 PROCEDURE — RXMED WILLOW AMBULATORY MEDICATION CHARGE

## 2025-04-05 ENCOUNTER — PHARMACY VISIT (OUTPATIENT)
Dept: PHARMACY | Facility: CLINIC | Age: 60
End: 2025-04-05
Payer: COMMERCIAL

## 2025-04-15 NOTE — PROGRESS NOTES
"  Clinical Pharmacy Appointment    Patient ID: Karina Zapata \"Marjan\" is a 60 y.o. female who presents for Diabetes.    Pt is here for Follow Up appointment.     Referring Provider: Jacqui Johnston, *  PCP: Jacqui Johnston MD   Last visit with PCP: 9/11/24   Next visit with PCP: TBD    Subjective   HPI  DIABETES MELLITUS TYPE 2:    Does patient follow with Endocrinology: No  Last optometry exam: April 2024  Most recent visit in Podiatry: N/A-- patient denies sores or cuts on feet today      Current diabetic medications include:  Mounjaro 10 mg weekly    Adverse Effects: None    Glucose Readings:  Glucometer/CGM Type: None  Patient tests BG 0 times per day    Any episodes of hypoglycemia? None.    Did patient treat episode of hypoglycemia appropriately? N/A  Does the patient have a prescription for ready-to-use Glucagon? Not on insulin  Does pt have proteinuria? No    Lifestyle:  Physical Activity: walking, swimming, weight training    Weight:  2/8/24 - 183 lbs  3/26/24 - 177 lbs  5/20/24 - 170 lbs  6/18/24 - 170 lbs  7/16/24 - 168 lbs  10/1/24 - 176 lbs  11/12/24 - 173 lbs, but lost 4 inches around waist in the past month  1/7/25 - 168.2 lbs   2/4/25 - 165 lbs  3/5/25 - 165 lbs  4/16/25 - 163 lbs    Secondary Prevention:  Statin? Yes  ACE-I/ARB? Yes  Aspirin? Yes    Pertinent PMH Review:  PMH of Pancreatitis: No  PMH of Retinopathy: No  PMH of Urinary Tract Infections: No  PMH of MTC: No     Drug Interactions  No relevant drug interactions were noted.    Medication System Management  Patient's preferred pharmacy: [facility] for Moun[facility]o [facility] for everything else  Adherence/Organization: No issues  Affordability/Accessibility: No issues    Objective   Allergies   Allergen Reactions    Meperidine Anaphylaxis    Shellfish Containing Products Hives     Social History     Social History Narrative    Not on file      Medication Review  Current Outpatient Medications   Medication " Instructions    amLODIPine (NORVASC) 5 mg, oral, Daily    aspirin 81 mg EC tablet 1 tablet, oral, Daily    cholecalciferol (Vitamin D-3) 50 MCG (2000 UT) tablet Vitamin D3 2000 IU take one daily    fluticasone (Flonase) 50 mcg/actuation nasal spray 1 spray, nasal, Daily    lisinopril 20 mg, oral, Daily    Mounjaro 10 mg, subcutaneous, Every 7 days    rosuvastatin (CRESTOR) 20 mg, oral, Nightly      Vitals  BP Readings from Last 2 Encounters:   10/07/24 145/83   09/11/24 130/80     BMI Readings from Last 1 Encounters:   10/07/24 30.00 kg/m²      Labs  A1C  Lab Results   Component Value Date    HGBA1C 6.4 (H) 09/11/2024    HGBA1C 6.7 (H) 02/20/2024    HGBA1C 11.9 (A) 04/13/2023     BMP  Lab Results   Component Value Date    CALCIUM 9.7 09/11/2024     09/11/2024    K 4.8 09/11/2024    CO2 25 09/11/2024     09/11/2024    BUN 15 09/11/2024    CREATININE 0.79 09/11/2024    EGFR 87 09/11/2024     LFTs  Lab Results   Component Value Date    ALT 19 02/20/2024    AST 14 02/20/2024    ALKPHOS 66 02/20/2024    BILITOT 0.4 02/20/2024     FLP  Lab Results   Component Value Date    TRIG 108 02/20/2024    CHOL 121 02/20/2024    LDLF 139 (H) 04/13/2023    LDLCALC 43 02/20/2024    HDL 56.3 02/20/2024     Urine Microalbumin  Lab Results   Component Value Date    MICROALBCREA  09/11/2024      Comment:      One or more analytes used in this calculation is outside of the analytical measurement range. Calculation cannot be performed.     Weight Management  Wt Readings from Last 3 Encounters:   10/07/24 74.4 kg (164 lb)   10/07/24 83.5 kg (184 lb)   09/11/24 83 kg (183 lb)      There is no height or weight on file to calculate BMI.     Assessment/Plan   Problem List Items Addressed This Visit       T2DM (type 2 diabetes mellitus) (Multi)    Patient's goal A1c is < 7%.  Is pt at goal? Yes, most recent A1C was 6.4%     A1C is well controlled and improved compared to the reading prior in February 2024. Patient denies any signs or  symptoms of hypo- or hyperglycemia. Reminded patient to complete labs. Will continue current Mounjaro dose and assess weight loss and labs at next visit.    Medication Changes:  CONTINUE:  Mounjaro 10 mg weekly         Relevant Medications    tirzepatide (Mounjaro) 10 mg/0.5 mL pen injector    Other Relevant Orders    Referral to Clinical Pharmacy     Clinical Pharmacist follow-up: 5/14/25, Telehealth visit    Continue all meds under the continuation of care with the referring provider and clinical pharmacy team.    Thank you,  Ana Garland, PharmD  Clinical Pharmacist  [phone]    Verbal consent to manage patient's drug therapy was obtained from the patient. They were informed they may decline to participate or withdraw from participation in pharmacy services at any time.

## 2025-04-16 ENCOUNTER — APPOINTMENT (OUTPATIENT)
Dept: PHARMACY | Facility: HOSPITAL | Age: 60
End: 2025-04-16
Payer: COMMERCIAL

## 2025-04-16 DIAGNOSIS — E11.9 TYPE 2 DIABETES MELLITUS WITHOUT COMPLICATION, WITHOUT LONG-TERM CURRENT USE OF INSULIN: ICD-10-CM

## 2025-04-16 RX ORDER — TIRZEPATIDE 10 MG/.5ML
10 INJECTION, SOLUTION SUBCUTANEOUS
Qty: 2 ML | Refills: 2 | Status: SHIPPED | OUTPATIENT
Start: 2025-04-16

## 2025-04-16 NOTE — ASSESSMENT & PLAN NOTE
Patient's goal A1c is < 7%.  Is pt at goal? Yes, most recent A1C was 6.4%     A1C is well controlled and improved compared to the reading prior in February 2024. Patient denies any signs or symptoms of hypo- or hyperglycemia. Reminded patient to complete labs. Will continue current Mounjaro dose and assess weight loss and labs at next visit.    Medication Changes:  CONTINUE:  Mounjaro 10 mg weekly

## 2025-04-26 PROCEDURE — RXMED WILLOW AMBULATORY MEDICATION CHARGE

## 2025-05-03 ENCOUNTER — PHARMACY VISIT (OUTPATIENT)
Dept: PHARMACY | Facility: CLINIC | Age: 60
End: 2025-05-03
Payer: COMMERCIAL

## 2025-05-14 ENCOUNTER — APPOINTMENT (OUTPATIENT)
Dept: PHARMACY | Facility: HOSPITAL | Age: 60
End: 2025-05-14
Payer: COMMERCIAL

## 2025-05-20 ENCOUNTER — APPOINTMENT (OUTPATIENT)
Dept: PHARMACY | Facility: HOSPITAL | Age: 60
End: 2025-05-20
Payer: COMMERCIAL

## 2025-05-20 DIAGNOSIS — E11.9 TYPE 2 DIABETES MELLITUS WITHOUT COMPLICATION, WITHOUT LONG-TERM CURRENT USE OF INSULIN: ICD-10-CM

## 2025-05-20 NOTE — PROGRESS NOTES
"  Clinical Pharmacy Appointment    Patient ID: Karina Zapata \"Marjan\" is a 60 y.o. female who presents for Diabetes.    Pt is here for Follow Up appointment.     Referring Provider: Jacqui Johnston, *  PCP: Jacqui Johnston MD   Last visit with PCP: 9/11/24   Next visit with PCP: TBD    Subjective   HPI  DIABETES MELLITUS TYPE 2:    Does patient follow with Endocrinology: No  Last optometry exam: April 2024 - scheduled for June 19th.  Most recent visit in Podiatry: N/A-- patient denies sores or cuts on feet today      Current diabetic medications include:  Mounjaro 10 mg weekly    Adverse Effects: None    Glucose Readings:  Glucometer/CGM Type: None  Patient tests BG 0 times per day    Any episodes of hypoglycemia? None.    Did patient treat episode of hypoglycemia appropriately? N/A  Does the patient have a prescription for ready-to-use Glucagon? Not on insulin  Does pt have proteinuria? No    Lifestyle:  Physical Activity: walking, swimming, weight training    Weight:  2/8/24 - 183 lbs  3/26/24 - 177 lbs  5/20/24 - 170 lbs  6/18/24 - 170 lbs  7/16/24 - 168 lbs  10/1/24 - 176 lbs  11/12/24 - 173 lbs, but lost 4 inches around waist in the past month  1/7/25 - 168.2 lbs   2/4/25 - 165 lbs  3/5/25 - 165 lbs  4/16/25 - 163 lbs  5/20/25 - 162 lbs    Secondary Prevention:  Statin? Yes  ACE-I/ARB? Yes  Aspirin? Yes    Pertinent PMH Review:  PMH of Pancreatitis: No  PMH of Retinopathy: No  PMH of Urinary Tract Infections: No  PMH of MTC: No     Drug Interactions  No relevant drug interactions were noted.    Medication System Management  Patient's preferred pharmacy: [facility] for Mounjaro, [facility] for everything else  Adherence/Organization: No issues  Affordability/Accessibility: No issues    Objective   Allergies   Allergen Reactions    Meperidine Anaphylaxis    Shellfish Containing Products Hives     Social History     Social History Narrative    Not on file      Medication " Review  Current Outpatient Medications   Medication Instructions    amLODIPine (NORVASC) 5 mg, oral, Daily    aspirin 81 mg EC tablet 1 tablet, oral, Daily    cholecalciferol (Vitamin D-3) 50 MCG (2000 UT) tablet Vitamin D3 2000 IU take one daily    fluticasone (Flonase) 50 mcg/actuation nasal spray 1 spray, nasal, Daily    lisinopril 20 mg, oral, Daily    Mounjaro 10 mg, subcutaneous, Every 7 days    rosuvastatin (CRESTOR) 20 mg, oral, Nightly      Vitals  BP Readings from Last 2 Encounters:   10/07/24 145/83   09/11/24 130/80     BMI Readings from Last 1 Encounters:   10/07/24 30.00 kg/m²      Labs  A1C  Lab Results   Component Value Date    HGBA1C 6.4 (H) 09/11/2024    HGBA1C 6.7 (H) 02/20/2024    HGBA1C 11.9 (A) 04/13/2023     BMP  Lab Results   Component Value Date    CALCIUM 9.7 09/11/2024     09/11/2024    K 4.8 09/11/2024    CO2 25 09/11/2024     09/11/2024    BUN 15 09/11/2024    CREATININE 0.79 09/11/2024    EGFR 87 09/11/2024     LFTs  Lab Results   Component Value Date    ALT 19 02/20/2024    AST 14 02/20/2024    ALKPHOS 66 02/20/2024    BILITOT 0.4 02/20/2024     FLP  Lab Results   Component Value Date    TRIG 108 02/20/2024    CHOL 121 02/20/2024    LDLF 139 (H) 04/13/2023    LDLCALC 43 02/20/2024    HDL 56.3 02/20/2024     Urine Microalbumin  Lab Results   Component Value Date    MICROALBCREA  09/11/2024      Comment:      One or more analytes used in this calculation is outside of the analytical measurement range. Calculation cannot be performed.     Weight Management  Wt Readings from Last 3 Encounters:   10/07/24 74.4 kg (164 lb)   10/07/24 83.5 kg (184 lb)   09/11/24 83 kg (183 lb)      There is no height or weight on file to calculate BMI.     Assessment/Plan   Problem List Items Addressed This Visit       T2DM (type 2 diabetes mellitus) (Multi)    Patient's goal A1c is < 7%.  Is pt at goal? Yes, most recent A1C was 6.4%    A1C is well controlled and improved compared to the reading  prior in February 2024. Patient denies any signs or symptoms of hypo- or hyperglycemia. Reminded patient to complete labs. Will follow up after labs result. Patient's weight has been stable due to being busy with child's graduation and other events. No changes to Mounjaro as patient resumes activity soon.    Medication Changes:  CONTINUE:  Mounjaro 10 mg weekly          Clinical Pharmacist follow-up: after lab results, Telehealth visit    Continue all meds under the continuation of care with the referring provider and clinical pharmacy team.    Thank you,  Ana Garland, PharmD  Clinical Pharmacist  [phone]    Verbal consent to manage patient's drug therapy was obtained from the patient. They were informed they may decline to participate or withdraw from participation in pharmacy services at any time.

## 2025-05-21 NOTE — ASSESSMENT & PLAN NOTE
Patient's goal A1c is < 7%.  Is pt at goal? Yes, most recent A1C was 6.4%    A1C is well controlled and improved compared to the reading prior in February 2024. Patient denies any signs or symptoms of hypo- or hyperglycemia. Reminded patient to complete labs. Will follow up after labs result. Patient's weight has been stable due to being busy with child's graduation and other events. No changes to Mounjaro as patient resumes activity soon.    Medication Changes:  CONTINUE:  Mounjaro 10 mg weekly

## 2025-05-24 PROCEDURE — RXMED WILLOW AMBULATORY MEDICATION CHARGE

## 2025-05-30 ENCOUNTER — PHARMACY VISIT (OUTPATIENT)
Dept: PHARMACY | Facility: CLINIC | Age: 60
End: 2025-05-30
Payer: COMMERCIAL

## 2025-06-20 DIAGNOSIS — E11.9 TYPE 2 DIABETES MELLITUS WITHOUT COMPLICATION, WITHOUT LONG-TERM CURRENT USE OF INSULIN: ICD-10-CM

## 2025-06-20 RX ORDER — METFORMIN HYDROCHLORIDE 500 MG/1
TABLET ORAL
Qty: 180 TABLET | Refills: 0 | OUTPATIENT
Start: 2025-06-20

## 2025-06-21 PROCEDURE — RXMED WILLOW AMBULATORY MEDICATION CHARGE

## 2025-06-26 DIAGNOSIS — I10 HYPERTENSION, UNSPECIFIED TYPE: Primary | ICD-10-CM

## 2025-06-27 ENCOUNTER — PHARMACY VISIT (OUTPATIENT)
Dept: PHARMACY | Facility: CLINIC | Age: 60
End: 2025-06-27
Payer: COMMERCIAL

## 2025-07-11 DIAGNOSIS — E11.9 TYPE 2 DIABETES MELLITUS WITHOUT COMPLICATION, WITHOUT LONG-TERM CURRENT USE OF INSULIN: ICD-10-CM

## 2025-07-11 RX ORDER — TIRZEPATIDE 10 MG/.5ML
10 INJECTION, SOLUTION SUBCUTANEOUS
Qty: 2 ML | Refills: 2 | Status: SHIPPED | OUTPATIENT
Start: 2025-07-11

## 2025-07-17 LAB
ALBUMIN SERPL-MCNC: 4.2 G/DL (ref 3.6–5.1)
ALP SERPL-CCNC: 64 U/L (ref 37–153)
ALT SERPL-CCNC: 19 U/L (ref 6–29)
ANION GAP SERPL CALCULATED.4IONS-SCNC: 7 MMOL/L (CALC) (ref 7–17)
AST SERPL-CCNC: 18 U/L (ref 10–35)
BILIRUB SERPL-MCNC: 0.6 MG/DL (ref 0.2–1.2)
BUN SERPL-MCNC: 13 MG/DL (ref 7–25)
CALCIUM SERPL-MCNC: 9.6 MG/DL (ref 8.6–10.4)
CHLORIDE SERPL-SCNC: 104 MMOL/L (ref 98–110)
CHOLEST SERPL-MCNC: 154 MG/DL
CHOLEST/HDLC SERPL: 2.3 (CALC)
CO2 SERPL-SCNC: 26 MMOL/L (ref 20–32)
CREAT SERPL-MCNC: 0.81 MG/DL (ref 0.5–1.03)
EGFRCR SERPLBLD CKD-EPI 2021: 84 ML/MIN/1.73M2
EST. AVERAGE GLUCOSE BLD GHB EST-MCNC: 137 MG/DL
EST. AVERAGE GLUCOSE BLD GHB EST-SCNC: 7.6 MMOL/L
GLUCOSE SERPL-MCNC: 157 MG/DL (ref 65–139)
HBA1C MFR BLD: 6.4 %
HDLC SERPL-MCNC: 68 MG/DL
LDLC SERPL CALC-MCNC: 68 MG/DL (CALC)
NONHDLC SERPL-MCNC: 86 MG/DL (CALC)
POTASSIUM SERPL-SCNC: 4.6 MMOL/L (ref 3.5–5.3)
PROT SERPL-MCNC: 6.9 G/DL (ref 6.1–8.1)
SODIUM SERPL-SCNC: 137 MMOL/L (ref 135–146)
TRIGL SERPL-MCNC: 92 MG/DL

## 2025-07-18 DIAGNOSIS — E11.9 TYPE 2 DIABETES MELLITUS WITHOUT COMPLICATION, WITHOUT LONG-TERM CURRENT USE OF INSULIN: Primary | ICD-10-CM

## 2025-07-18 PROCEDURE — RXMED WILLOW AMBULATORY MEDICATION CHARGE

## 2025-07-18 RX ORDER — METFORMIN HYDROCHLORIDE 500 MG/1
500 TABLET ORAL 2 TIMES DAILY
Qty: 180 TABLET | Refills: 0 | Status: SHIPPED | OUTPATIENT
Start: 2025-07-18 | End: 2025-10-16

## 2025-07-24 ENCOUNTER — OFFICE VISIT (OUTPATIENT)
Dept: URGENT CARE | Age: 60
End: 2025-07-24
Payer: COMMERCIAL

## 2025-07-24 ENCOUNTER — PHARMACY VISIT (OUTPATIENT)
Dept: PHARMACY | Facility: CLINIC | Age: 60
End: 2025-07-24
Payer: COMMERCIAL

## 2025-07-24 DIAGNOSIS — J01.00 ACUTE NON-RECURRENT MAXILLARY SINUSITIS: Primary | ICD-10-CM

## 2025-07-24 DIAGNOSIS — H60.501 ACUTE OTITIS EXTERNA OF RIGHT EAR, UNSPECIFIED TYPE: ICD-10-CM

## 2025-07-24 RX ORDER — CIPROFLOXACIN AND DEXAMETHASONE 3; 1 MG/ML; MG/ML
4 SUSPENSION/ DROPS AURICULAR (OTIC) 2 TIMES DAILY
Qty: 7.5 ML | Refills: 0 | Status: SHIPPED | OUTPATIENT
Start: 2025-07-24 | End: 2025-07-31

## 2025-07-24 RX ORDER — AMOXICILLIN AND CLAVULANATE POTASSIUM 875; 125 MG/1; MG/1
875 TABLET, FILM COATED ORAL 2 TIMES DAILY
Qty: 14 TABLET | Refills: 0 | Status: SHIPPED | OUTPATIENT
Start: 2025-07-24 | End: 2025-07-31

## 2025-07-24 ASSESSMENT — ENCOUNTER SYMPTOMS
COUGH: 1
SORE THROAT: 0
WHEEZING: 0
SHORTNESS OF BREATH: 0
CHILLS: 0
CHEST TIGHTNESS: 0
RHINORRHEA: 1
FEVER: 1
SINUS PRESSURE: 1

## 2025-07-24 NOTE — PROGRESS NOTES
Urgent Care Virtual Video Visit    Patient Location: patient's home.  Provider Location: [facility]    I have communicated my name and active licensure. Video visit completed with realtime synchronous video/audio connection. Informed consent was obtained from the patient. Patient was made aware that my evaluation and diagnosis are limited due to the fact that we are not in the same room during the interview and that this is a virtual encounter that took place via videoconferencing. Patient verbalized understanding.     Patient presents via telemedicine for evaluation of upper respiratory illness symptoms that have been present for almost a week.  She states she has had low-grade fevers, sinus pressure to maxillary face, nasal congestion, runny nose, postnasal drainage, slight cough.  She has also began to developed right ear pain and pressure.  She did have slight nonbloody drainage from her right ear.  She denies any sore throat, shortness of breath.  She has tried over-the-counter decongestants without relief of symptoms.  Patient states she has been swimming lately.       Review of Systems   Constitutional:  Positive for fever. Negative for chills.   HENT:  Positive for congestion, ear discharge, ear pain (right), rhinorrhea and sinus pressure. Negative for sore throat.    Respiratory:  Positive for cough. Negative for chest tightness, shortness of breath and wheezing.       Physical Exam  Constitutional:       Comments: Patient is alert and oriented, seated comfortably in her home.  She appears in no acute distress.  Physical examination is limited by the nature of a telemedicine visit.   HENT:      Ears:      Comments: Patient self palpation of tragus and helix reveal tenderness.     Nose:      Comments: Patient self palpation of bilateral maxillary sinuses revealed tenderness to palpation.  Frontal sinuses do not reveal tenderness to palpation.  Pulmonary:      Comments: Patient is able to speak in full  sentences without any signs of respiratory distress.  She does not cough during visit.    Assessment/Plan    Patient presents via telemedicine for evaluation of upper respiratory symptoms that appear consistent with likely sinusitis.  We did discuss the limitations of the leading assessment in regards to ear pain.  Given that she has been swimming and she is having some tragal and helical tenderness we discussed possible otitis externa.  Patient was initiated on Augmentin and Ciprodex to cover both types of infection.  She stated she was unable to come in for in person visit today.  We did discuss to follow-up for in person visit or with her PCP if symptoms or not resolving with treatment.  Patient verbalized understanding was agreeable with this plan.    Patient disposition: Home    Electronically signed by Deneen Lloyd PA-C  9:30 AM

## 2025-07-24 NOTE — LETTER
July 24, 2025     Patient: Karina Zapata   YOB: 1965   Date of Visit: 7/24/2025       To Whom It May Concern:    Karina Zapata was seen in my clinic on 7/24/2025 at 9:10 am. Please excuse Karina for her absence from work on this day.    If you have any questions or concerns, please don't hesitate to call.         Sincerely,         Deneen Lloyd PA-C

## 2025-08-05 ENCOUNTER — APPOINTMENT (OUTPATIENT)
Dept: PHARMACY | Facility: HOSPITAL | Age: 60
End: 2025-08-05
Payer: COMMERCIAL

## 2025-08-05 DIAGNOSIS — E11.9 TYPE 2 DIABETES MELLITUS WITHOUT COMPLICATION, WITHOUT LONG-TERM CURRENT USE OF INSULIN: Primary | ICD-10-CM

## 2025-08-05 RX ORDER — TIRZEPATIDE 12.5 MG/.5ML
12.5 INJECTION, SOLUTION SUBCUTANEOUS WEEKLY
Qty: 2 ML | Refills: 2 | Status: SHIPPED | OUTPATIENT
Start: 2025-08-05

## 2025-08-05 NOTE — PROGRESS NOTES
"  Clinical Pharmacy Appointment    Patient ID: Karina Zapata \"Fuentes" is a 60 y.o. female who presents for Diabetes.    Pt is here for Follow Up appointment.     Referring Provider: Jacqui Johnston, *  PCP: Jacqui Johnston MD   Last visit with PCP: 9/11/24   Next visit with PCP: 11/6/25    Subjective   HPI  DIABETES MELLITUS TYPE 2:    Does patient follow with Endocrinology: No  Last optometry exam: June 2025  Most recent visit in Podiatry: N/A-- patient denies sores or cuts on feet today      Current diabetic medications include:  Mounjaro 10 mg weekly  Metformin 500 mg twice daily    Adverse Effects: None    Glucose Readings:  Glucometer/CGM Type: None  Patient tests BG 0 times per day    Any episodes of hypoglycemia? None.    Did patient treat episode of hypoglycemia appropriately? N/A  Does the patient have a prescription for ready-to-use Glucagon? Not on insulin  Does pt have proteinuria? No    Lifestyle:  Physical Activity: walking, swimming, weight training    Weight:  2/8/24 - 183 lbs  3/26/24 - 177 lbs  5/20/24 - 170 lbs  6/18/24 - 170 lbs  7/16/24 - 168 lbs  10/1/24 - 176 lbs  11/12/24 - 173 lbs, but lost 4 inches around waist in the past month  1/7/25 - 168.2 lbs   2/4/25 - 165 lbs  3/5/25 - 165 lbs  4/16/25 - 173 lbs  5/20/25 - 174 lbs  8/5/25 - 172 lbs    Secondary Prevention:  Statin? Yes  ACE-I/ARB? Yes  Aspirin? Yes    Pertinent PMH Review:  PMH of Pancreatitis: No  PMH of Retinopathy: No  PMH of Urinary Tract Infections: No  PMH of MTC: No     Drug Interactions  No relevant drug interactions were noted.    Medication System Management  Patient's preferred pharmacy: [facility] for Mounjaro, [facility] for everything else  Adherence/Organization: No issues  Affordability/Accessibility: No issues    Objective   Allergies   Allergen Reactions    Meperidine Anaphylaxis    Shellfish Containing Products Hives     Social History     Social History Narrative    Not on file "      Medication Review  Current Outpatient Medications   Medication Instructions    amLODIPine (NORVASC) 5 mg, oral, Daily    aspirin 81 mg EC tablet 1 tablet, oral, Daily    cholecalciferol (Vitamin D-3) 50 MCG (2000 UT) tablet Vitamin D3 2000 IU take one daily    fluticasone (Flonase) 50 mcg/actuation nasal spray 1 spray, nasal, Daily    lisinopril 20 mg, oral, Daily    metFORMIN (GLUCOPHAGE) 500 mg, oral, 2 times daily    Mounjaro 12.5 mg, subcutaneous, Weekly    rosuvastatin (CRESTOR) 20 mg, oral, Nightly      Vitals  BP Readings from Last 2 Encounters:   10/07/24 145/83   09/11/24 130/80     BMI Readings from Last 1 Encounters:   10/07/24 30.00 kg/m²      Labs  A1C  Lab Results   Component Value Date    HGBA1C 6.4 (H) 07/16/2025    HGBA1C 6.4 (H) 09/11/2024    HGBA1C 6.7 (H) 02/20/2024     BMP  Lab Results   Component Value Date    CALCIUM 9.6 07/16/2025     07/16/2025    K 4.6 07/16/2025    CO2 26 07/16/2025     07/16/2025    BUN 13 07/16/2025    CREATININE 0.81 07/16/2025    EGFR 84 07/16/2025     LFTs  Lab Results   Component Value Date    ALT 19 07/16/2025    AST 18 07/16/2025    ALKPHOS 64 07/16/2025    BILITOT 0.6 07/16/2025     FLP  Lab Results   Component Value Date    TRIG 92 07/16/2025    CHOL 154 07/16/2025    LDLF 139 (H) 04/13/2023    LDLCALC 68 07/16/2025    HDL 68 07/16/2025     Urine Microalbumin  Lab Results   Component Value Date    MICROALBCREA  09/11/2024      Comment:      One or more analytes used in this calculation is outside of the analytical measurement range. Calculation cannot be performed.     Weight Management  Wt Readings from Last 3 Encounters:   10/07/24 74.4 kg (164 lb)   10/07/24 83.5 kg (184 lb)   09/11/24 83 kg (183 lb)      There is no height or weight on file to calculate BMI.     Assessment/Plan   Problem List Items Addressed This Visit       T2DM (type 2 diabetes mellitus) (Multi) - Primary    Patient's goal A1c is < 7%.  Is pt at goal? Yes, most recent A1C  was 6.4%    A1C is well controlled and stable compared to September 2024. Patient denies any signs or symptoms of hypo- or hyperglycemia. Metformin dose reduced after labs were completed. Weight has been stable despite resuming physical activities. Patient would like to increase Mounjaro for potential additional weight loss. New prescription sent and she will increase when she finishes her last two doses of Mounjaro 10 mg. Follow up in 6 weeks.    Medication Changes:  INCREASE:  Mounjaro from 10 mg to 12.5 mg weekly  CONTINUE  Metformin 500 mg twice daily         Relevant Medications    tirzepatide (Mounjaro) 12.5 mg/0.5 mL pen injector    Other Relevant Orders    Referral to Clinical Pharmacy     Clinical Pharmacist follow-up: 9/16/25, Telehealth visit    Continue all meds under the continuation of care with the referring provider and clinical pharmacy team.    Thank you,  Ana Garland, PharmD  Clinical Pharmacist  [phone]    Verbal consent to manage patient's drug therapy was obtained from the patient. They were informed they may decline to participate or withdraw from participation in pharmacy services at any time.

## 2025-08-06 NOTE — ASSESSMENT & PLAN NOTE
Patient's goal A1c is < 7%.  Is pt at goal? Yes, most recent A1C was 6.4%    A1C is well controlled and stable compared to September 2024. Patient denies any signs or symptoms of hypo- or hyperglycemia. Metformin dose reduced after labs were completed. Weight has been stable despite resuming physical activities. Patient would like to increase Mounjaro for potential additional weight loss. New prescription sent and she will increase when she finishes her last two doses of Mounjaro 10 mg. Follow up in 6 weeks.    Medication Changes:  INCREASE:  Mounjaro from 10 mg to 12.5 mg weekly  CONTINUE  Metformin 500 mg twice daily

## 2025-08-14 PROCEDURE — RXMED WILLOW AMBULATORY MEDICATION CHARGE

## 2025-08-23 ENCOUNTER — PHARMACY VISIT (OUTPATIENT)
Dept: PHARMACY | Facility: CLINIC | Age: 60
End: 2025-08-23
Payer: COMMERCIAL

## 2025-09-16 ENCOUNTER — APPOINTMENT (OUTPATIENT)
Dept: PHARMACY | Facility: HOSPITAL | Age: 60
End: 2025-09-16
Payer: COMMERCIAL

## 2025-11-06 ENCOUNTER — APPOINTMENT (OUTPATIENT)
Dept: PRIMARY CARE | Facility: CLINIC | Age: 60
End: 2025-11-06
Payer: COMMERCIAL